# Patient Record
Sex: MALE | Race: WHITE | HISPANIC OR LATINO | Employment: UNEMPLOYED | URBAN - METROPOLITAN AREA
[De-identification: names, ages, dates, MRNs, and addresses within clinical notes are randomized per-mention and may not be internally consistent; named-entity substitution may affect disease eponyms.]

---

## 2018-05-28 ENCOUNTER — HOSPITAL ENCOUNTER (EMERGENCY)
Facility: MEDICAL CENTER | Age: 38
End: 2018-05-28
Attending: EMERGENCY MEDICINE
Payer: MEDICAID

## 2018-05-28 ENCOUNTER — APPOINTMENT (OUTPATIENT)
Dept: RADIOLOGY | Facility: MEDICAL CENTER | Age: 38
End: 2018-05-28
Attending: EMERGENCY MEDICINE
Payer: MEDICAID

## 2018-05-28 VITALS
HEART RATE: 74 BPM | DIASTOLIC BLOOD PRESSURE: 74 MMHG | WEIGHT: 135.58 LBS | BODY MASS INDEX: 20.55 KG/M2 | TEMPERATURE: 97.9 F | HEIGHT: 68 IN | OXYGEN SATURATION: 98 % | RESPIRATION RATE: 15 BRPM | SYSTOLIC BLOOD PRESSURE: 117 MMHG

## 2018-05-28 DIAGNOSIS — R30.0 DYSURIA: ICD-10-CM

## 2018-05-28 DIAGNOSIS — N50.819 TESTICLE PAIN: ICD-10-CM

## 2018-05-28 LAB
APPEARANCE UR: CLEAR
BILIRUB UR QL STRIP.AUTO: NEGATIVE
COLOR UR: YELLOW
GLUCOSE UR STRIP.AUTO-MCNC: NEGATIVE MG/DL
KETONES UR STRIP.AUTO-MCNC: NEGATIVE MG/DL
LEUKOCYTE ESTERASE UR QL STRIP.AUTO: NEGATIVE
MICRO URNS: NORMAL
NITRITE UR QL STRIP.AUTO: NEGATIVE
PH UR STRIP.AUTO: 6.5 [PH]
PROT UR QL STRIP: NEGATIVE MG/DL
RBC UR QL AUTO: NEGATIVE
SP GR UR STRIP.AUTO: 1
UROBILINOGEN UR STRIP.AUTO-MCNC: 0.2 MG/DL

## 2018-05-28 PROCEDURE — 51798 US URINE CAPACITY MEASURE: CPT

## 2018-05-28 PROCEDURE — 87591 N.GONORRHOEAE DNA AMP PROB: CPT

## 2018-05-28 PROCEDURE — 700111 HCHG RX REV CODE 636 W/ 250 OVERRIDE (IP): Performed by: EMERGENCY MEDICINE

## 2018-05-28 PROCEDURE — 96372 THER/PROPH/DIAG INJ SC/IM: CPT

## 2018-05-28 PROCEDURE — 99284 EMERGENCY DEPT VISIT MOD MDM: CPT

## 2018-05-28 PROCEDURE — 76870 US EXAM SCROTUM: CPT

## 2018-05-28 PROCEDURE — 87491 CHLMYD TRACH DNA AMP PROBE: CPT

## 2018-05-28 PROCEDURE — 700102 HCHG RX REV CODE 250 W/ 637 OVERRIDE(OP): Performed by: EMERGENCY MEDICINE

## 2018-05-28 PROCEDURE — A9270 NON-COVERED ITEM OR SERVICE: HCPCS | Performed by: EMERGENCY MEDICINE

## 2018-05-28 PROCEDURE — 700101 HCHG RX REV CODE 250

## 2018-05-28 PROCEDURE — 81003 URINALYSIS AUTO W/O SCOPE: CPT

## 2018-05-28 RX ORDER — LIDOCAINE HYDROCHLORIDE 10 MG/ML
INJECTION, SOLUTION INFILTRATION; PERINEURAL
Status: COMPLETED
Start: 2018-05-28 | End: 2018-05-28

## 2018-05-28 RX ORDER — CEFTRIAXONE SODIUM 250 MG/1
250 INJECTION, POWDER, FOR SOLUTION INTRAMUSCULAR; INTRAVENOUS ONCE
Status: COMPLETED | OUTPATIENT
Start: 2018-05-28 | End: 2018-05-28

## 2018-05-28 RX ORDER — AZITHROMYCIN 250 MG/1
1000 TABLET, FILM COATED ORAL ONCE
Status: COMPLETED | OUTPATIENT
Start: 2018-05-28 | End: 2018-05-28

## 2018-05-28 RX ADMIN — AZITHROMYCIN 1000 MG: 250 TABLET, FILM COATED ORAL at 12:32

## 2018-05-28 RX ADMIN — LIDOCAINE HYDROCHLORIDE: 10 INJECTION, SOLUTION INFILTRATION; PERINEURAL at 12:32

## 2018-05-28 RX ADMIN — CEFTRIAXONE SODIUM 250 MG: 250 INJECTION, POWDER, FOR SOLUTION INTRAMUSCULAR; INTRAVENOUS at 12:32

## 2018-05-28 ASSESSMENT — PAIN SCALES - GENERAL
PAINLEVEL_OUTOF10: 0
PAINLEVEL_OUTOF10: 6

## 2018-05-28 ASSESSMENT — LIFESTYLE VARIABLES: DO YOU DRINK ALCOHOL: NO

## 2018-05-28 NOTE — ED PROVIDER NOTES
"ED Provider Note    Scribed for Mathieu Arteaga M.D. by Hiren Mishra. 5/28/2018  10:45 AM    Primary care provider: None noted  Means of arrival: Walk in  History obtained from: Patient  History limited by: None    CHIEF COMPLAINT  Chief Complaint   Patient presents with   • Painful Urination     Pt reports painful urination/pressure, unable to urinate since yesterday. pt denies hx of such.    • Unable to Urinate       HPI  Abel Orozco is a 38 y.o. male who presents to the Emergency Department for evaluation of acute urinary retention onset this morning when he woke up. The patient endorses associated mild testicular pain that is slightly worse on the left. He denies any dysuria or urethral discharge. The patient reports a new recent sexual partner. No alleviating or exacerbating factors are identified at this time.     REVIEW OF SYSTEMS  Pertinent positives include urinary retention and testicular pain.   Pertinent negatives include no dysuria or urethral discharge.    All other systems reviewed and negative.   C.     PAST MEDICAL HISTORY   None noted    SURGICAL HISTORY  patient denies any surgical history    SOCIAL HISTORY  Social History   Substance Use Topics   • Smoking status: Current Every Day Smoker     Packs/day: 0.50     Types: Cigarettes   • Smokeless tobacco: Never Used   • Alcohol use No      History   Drug Use No       FAMILY HISTORY  History reviewed. No pertinent family history.    CURRENT MEDICATIONS  Home Medications    **Home medications have not yet been reviewed for this encounter**         ALLERGIES  No Known Allergies    PHYSICAL EXAM  VITAL SIGNS: /84   Pulse 76   Temp 36.6 °C (97.9 °F)   Resp 16   Ht 1.727 m (5' 8\")   Wt 61.5 kg (135 lb 9.3 oz)   SpO2 95%   BMI 20.62 kg/m²     Nursing note and vitals reviewed.  Constitutional: Well-developed and well-nourished. No distress.   HENT: Head is normocephalic and atraumatic. Oropharynx is clear and moist without exudate or " erythema.   Eyes: Pupils are equal, round, and reactive to light. Conjunctiva are normal.   Cardiovascular: Normal rate and regular rhythm. No murmur heard. Normal radial pulses.  Pulmonary/Chest: Breath sounds normal. No wheezes or rales.   Abdominal: Soft and non-tender. No distention  No reproducible abdominal tenderness to palpation.   Musculoskeletal: Extremities exhibit normal range of motion without edema or tenderness.   Neurological: Awake, alert and oriented to person, place, and time. No focal deficits noted.  Skin: Skin is warm and dry. No rash.   Psychiatric: Normal mood and affect. Appropriate for clinical situation      DIAGNOSTIC STUDIES / PROCEDURES    LABS  Results for orders placed or performed during the hospital encounter of 05/28/18   URINALYSIS,CULTURE IF INDICATED   Result Value Ref Range    Color Yellow     Character Clear     Specific Gravity 1.005 <1.035    Ph 6.5 5.0 - 8.0    Glucose Negative Negative mg/dL    Ketones Negative Negative mg/dL    Protein Negative Negative mg/dL    Bilirubin Negative Negative    Urobilinogen, Urine 0.2 Negative    Nitrite Negative Negative    Leukocyte Esterase Negative Negative    Occult Blood Negative Negative    Micro Urine Req see below    CHLAMYDIA/GC PCR URINE OR SWAB   Result Value Ref Range    Source Urine      All labs reviewed by me.    RADIOLOGY  [id number]   Final Result      1.  Small bilateral epididymal head cysts.      2.  Questionable mild left varicocele.        The radiologist's interpretation of all radiological studies have been reviewed by me.    COURSE & MEDICAL DECISION MAKING  Nursing notes, VS, PMSFHx reviewed in chart.     10:45 AM - Patient seen and examined at bedside. Ordered Chlyamydia, Urinalysis, and US-Scrotum to evaluate his symptoms. The differential diagnoses include but are not limited to: epididymitis, UTI, STI.    12:12 PM - Reviewed patient's lab and radiology results as shown above.     12:40 PM - Patient  reevaluated at bedside. He was informed of lab and radiology results. Patient will receive Rocephin 250 mg and Zithromax 1000 mg. Patient made aware he will be discharged. He is agreeable.     The patient will return for new or worsening symptoms and is stable at the time of discharge.    DISPOSITION:  Patient will be discharged home in stable condition.    FOLLOW UP:  [facility], Emergency Dept  [address] [facility] [address] [phone]    If symptoms worsen    [address] [address]  [phone]  Schedule an appointment as soon as possible for a visit in 2 days  please have the clinic obtain and review lab results.      OUTPATIENT MEDICATIONS:  There are no discharge medications for this patient.    The patient was discharged home with an information sheet on Dysuria and told to return immediately for any signs or symptoms listed.  The patient agreed to the discharge precautions and follow-up plan which is documented in EPIC.    FINAL IMPRESSION  1. Dysuria    2. Testicle pain          Hiren NICHOLSON (Fe), am scribing for, and in the presence of, Mathieu Arteaga M.D..    Electronically signed by: Hiren Sullivan), 5/28/2018    Mathieu NICHOLSON M.D. personally performed the services described in this documentation, as scribed by Hiren Mishra in my presence, and it is both accurate and complete.    The note accurately reflects work and decisions made by me.  Mathieu Arteaga  5/28/2018  3:43 PM

## 2018-05-28 NOTE — ED TRIAGE NOTES
Chief Complaint   Patient presents with   • Painful Urination     Pt reports painful urination/pressure, unable to urinate since yesterday. pt denies hx of such.    • Unable to Urinate     Explained to pt triage process, made pt aware to tell this RN of any changes/concerns, pt verbalized understanding of process and instructions given. Pt to ER lobby.

## 2018-05-29 LAB
C TRACH DNA SPEC QL NAA+PROBE: NEGATIVE
N GONORRHOEA DNA SPEC QL NAA+PROBE: NEGATIVE
SPECIMEN SOURCE: NORMAL

## 2019-07-16 ENCOUNTER — HOSPITAL ENCOUNTER (EMERGENCY)
Facility: MEDICAL CENTER | Age: 39
End: 2019-07-17
Attending: EMERGENCY MEDICINE

## 2019-07-16 DIAGNOSIS — F41.9 ANXIETY: ICD-10-CM

## 2019-07-16 DIAGNOSIS — F23 ACUTE PSYCHOSIS (HCC): ICD-10-CM

## 2019-07-16 LAB
ALBUMIN SERPL BCP-MCNC: 4.5 G/DL (ref 3.2–4.9)
ALBUMIN/GLOB SERPL: 1.6 G/DL
ALP SERPL-CCNC: 89 U/L (ref 30–99)
ALT SERPL-CCNC: 33 U/L (ref 2–50)
AMPHET UR QL SCN: NEGATIVE
ANION GAP SERPL CALC-SCNC: 9 MMOL/L (ref 0–11.9)
AST SERPL-CCNC: 58 U/L (ref 12–45)
BARBITURATES UR QL SCN: NEGATIVE
BASOPHILS # BLD AUTO: 0.5 % (ref 0–1.8)
BASOPHILS # BLD: 0.03 K/UL (ref 0–0.12)
BENZODIAZ UR QL SCN: NEGATIVE
BILIRUB SERPL-MCNC: 1.4 MG/DL (ref 0.1–1.5)
BUN SERPL-MCNC: 16 MG/DL (ref 8–22)
BZE UR QL SCN: NEGATIVE
CALCIUM SERPL-MCNC: 9.3 MG/DL (ref 8.5–10.5)
CANNABINOIDS UR QL SCN: POSITIVE
CHLORIDE SERPL-SCNC: 108 MMOL/L (ref 96–112)
CO2 SERPL-SCNC: 24 MMOL/L (ref 20–33)
CREAT SERPL-MCNC: 1.17 MG/DL (ref 0.5–1.4)
EOSINOPHIL # BLD AUTO: 0.07 K/UL (ref 0–0.51)
EOSINOPHIL NFR BLD: 1.2 % (ref 0–6.9)
ERYTHROCYTE [DISTWIDTH] IN BLOOD BY AUTOMATED COUNT: 43.3 FL (ref 35.9–50)
ETHANOL BLD-MCNC: 0 G/DL
GLOBULIN SER CALC-MCNC: 2.9 G/DL (ref 1.9–3.5)
GLUCOSE SERPL-MCNC: 104 MG/DL (ref 65–99)
HCT VFR BLD AUTO: 45.7 % (ref 42–52)
HGB BLD-MCNC: 15.7 G/DL (ref 14–18)
IMM GRANULOCYTES # BLD AUTO: 0.02 K/UL (ref 0–0.11)
IMM GRANULOCYTES NFR BLD AUTO: 0.3 % (ref 0–0.9)
LYMPHOCYTES # BLD AUTO: 1.83 K/UL (ref 1–4.8)
LYMPHOCYTES NFR BLD: 30.5 % (ref 22–41)
MCH RBC QN AUTO: 31.2 PG (ref 27–33)
MCHC RBC AUTO-ENTMCNC: 34.4 G/DL (ref 33.7–35.3)
MCV RBC AUTO: 90.9 FL (ref 81.4–97.8)
METHADONE UR QL SCN: NEGATIVE
MONOCYTES # BLD AUTO: 0.49 K/UL (ref 0–0.85)
MONOCYTES NFR BLD AUTO: 8.2 % (ref 0–13.4)
NEUTROPHILS # BLD AUTO: 3.56 K/UL (ref 1.82–7.42)
NEUTROPHILS NFR BLD: 59.3 % (ref 44–72)
NRBC # BLD AUTO: 0 K/UL
NRBC BLD-RTO: 0 /100 WBC
OPIATES UR QL SCN: NEGATIVE
OXYCODONE UR QL SCN: NEGATIVE
PCP UR QL SCN: NEGATIVE
PLATELET # BLD AUTO: 217 K/UL (ref 164–446)
PMV BLD AUTO: 9.5 FL (ref 9–12.9)
POTASSIUM SERPL-SCNC: 4.1 MMOL/L (ref 3.6–5.5)
PROPOXYPH UR QL SCN: NEGATIVE
PROT SERPL-MCNC: 7.4 G/DL (ref 6–8.2)
RBC # BLD AUTO: 5.03 M/UL (ref 4.7–6.1)
SODIUM SERPL-SCNC: 141 MMOL/L (ref 135–145)
WBC # BLD AUTO: 6 K/UL (ref 4.8–10.8)

## 2019-07-16 PROCEDURE — 99285 EMERGENCY DEPT VISIT HI MDM: CPT

## 2019-07-16 PROCEDURE — 99284 EMERGENCY DEPT VISIT MOD MDM: CPT | Mod: GC | Performed by: PSYCHIATRY & NEUROLOGY

## 2019-07-16 PROCEDURE — 700111 HCHG RX REV CODE 636 W/ 250 OVERRIDE (IP): Performed by: EMERGENCY MEDICINE

## 2019-07-16 PROCEDURE — 85025 COMPLETE CBC W/AUTO DIFF WBC: CPT

## 2019-07-16 PROCEDURE — 80307 DRUG TEST PRSMV CHEM ANLYZR: CPT

## 2019-07-16 PROCEDURE — 96374 THER/PROPH/DIAG INJ IV PUSH: CPT

## 2019-07-16 PROCEDURE — 80053 COMPREHEN METABOLIC PANEL: CPT

## 2019-07-16 RX ORDER — LORAZEPAM 2 MG/ML
2 INJECTION INTRAMUSCULAR ONCE
Status: DISCONTINUED | OUTPATIENT
Start: 2019-07-16 | End: 2019-07-16

## 2019-07-16 RX ORDER — DIPHENHYDRAMINE HYDROCHLORIDE 50 MG/ML
25 INJECTION INTRAMUSCULAR; INTRAVENOUS ONCE
Status: DISCONTINUED | OUTPATIENT
Start: 2019-07-16 | End: 2019-07-17 | Stop reason: HOSPADM

## 2019-07-16 RX ORDER — LORAZEPAM 2 MG/ML
1 INJECTION INTRAMUSCULAR ONCE
Status: COMPLETED | OUTPATIENT
Start: 2019-07-16 | End: 2019-07-16

## 2019-07-16 RX ORDER — ARIPIPRAZOLE 10 MG/1
10 TABLET ORAL DAILY
Status: DISCONTINUED | OUTPATIENT
Start: 2019-07-16 | End: 2019-07-17 | Stop reason: HOSPADM

## 2019-07-16 RX ORDER — HALOPERIDOL 5 MG/ML
5 INJECTION INTRAMUSCULAR ONCE
Status: DISCONTINUED | OUTPATIENT
Start: 2019-07-16 | End: 2019-07-17 | Stop reason: HOSPADM

## 2019-07-16 RX ORDER — LORAZEPAM 2 MG/ML
2 INJECTION INTRAMUSCULAR ONCE
Status: DISCONTINUED | OUTPATIENT
Start: 2019-07-16 | End: 2019-07-17 | Stop reason: HOSPADM

## 2019-07-16 RX ADMIN — LORAZEPAM 1 MG: 2 INJECTION INTRAMUSCULAR; INTRAVENOUS at 08:26

## 2019-07-16 ASSESSMENT — ENCOUNTER SYMPTOMS
SHORTNESS OF BREATH: 0
HEADACHES: 0
SORE THROAT: 0
DIARRHEA: 0
CONSTIPATION: 0
COUGH: 0
FEVER: 0
VOMITING: 0
DIZZINESS: 0
DEPRESSION: 0
NAUSEA: 0
BLURRED VISION: 0
NERVOUS/ANXIOUS: 1
HALLUCINATIONS: 0
HEARTBURN: 0

## 2019-07-16 ASSESSMENT — LIFESTYLE VARIABLES: SUBSTANCE_ABUSE: 1

## 2019-07-16 NOTE — ED NOTES
"Pt is very anxious with rambling speech saying that \"I have been working out and working 24/7 and I have not eaten or drank anything for the last 4 days for a sacred initiation\" and \"I have broken all my bones for the initiation.\" Pt's best friend is at the bedside and states that he had an episode similar to this in February where he was very violent, got incarcerated, and \"they said it was a psychotic break.\" She states that at that time he slept for 4 days in a row and then \"got back to himself.\" She says that she doesn't know if he was diagnosed with anything at that time and didn't start taking any medications. She states she thinks that he may have a psychiatrist but that he hasn't been going or taking meds. She also states that he was much more anxious and violent last night but they could not convince the pt to come to the hospital until today.  "

## 2019-07-16 NOTE — ED TRIAGE NOTES
"Chief Complaint   Patient presents with   • Anxiety   • Dizziness     One week of anxiety, dizziness, and nausea.      /85   Pulse 69   Temp 36.1 °C (96.9 °F) (Temporal)   Resp 20   Ht 1.727 m (5' 8\")   Wt 55.5 kg (122 lb 5.7 oz)   SpO2 98%   BMI 18.60 kg/m²     40 y/o male presents to ED with vague complaints of anxiety, nausea, and dizziness. Patient's friend states that he, \"has not been acting himself over the past week\". She states he had a similar episode in 02/2019 of, \"psychiatric crisis\", and was sent to detention, but does not have any formal psych diagnoses.  Friend states he has not slept in several days. He states he has been stressed as he lost his job last week.  Denies SI, Denies HI.     Patient appears anxious and noted to have pressured speech in triage. Educated on triage process and to notify triage RN of any changes.   "

## 2019-07-16 NOTE — ED NOTES
Pt ambulated from lobby to [address] without assistance, tolerated well. Pt is now resting in bed with even and unlabored breaths, in no distress at this time. Will continue to monitor pt while awaiting further orders.

## 2019-07-16 NOTE — ED NOTES
"Psych resident aware pt will not take medications. Pt continues to roll on ground and states \"he needs to do it to calm down\".   "

## 2019-07-16 NOTE — CONSULTS
Alert Team  Pt seen directly by psychiatry today.  Pt placed on legal hold.  Pt remains on AT services for any needs while in the ER.

## 2019-07-16 NOTE — ED NOTES
Pt comes out of room and enters nurses station hostile. Pt began yelling at nurse. Pt asked to back up and he got more angry. Security called. Pt medicated per orders

## 2019-07-16 NOTE — ED NOTES
Report given to ARACELIS Currie. Pt is sitting on bed talking to friend at bedside. Pt does not appear to be in distress but he is still very anxious.

## 2019-07-16 NOTE — ED NOTES
Med Rec completed per patient   Allergies reviewed  No ORAL antibiotics in last 14 days    Patient stated he takes no medications

## 2019-07-16 NOTE — ED PROVIDER NOTES
"ED Provider Note    ED Provider Note    Primary care provider: No primary care provider on file.  Means of arrival: POV  History obtained from: Patient, Ex-wife  History limited by: None    CHIEF COMPLAINT  Chief Complaint   Patient presents with   • Anxiety   • Dizziness     One week of anxiety, dizziness, and nausea.        HPI  Jamil Orozco is a 39 y.o. male who presents to the Emergency Department with a chief complaint of anxiety and dizziness.  Patient states he is at the end of a 4-day \"sacred ritual\".  States he been preparing for this for the last 30 years.  He is dealt with anxiety his whole life.  As part of this ritual, he has not eaten or drink  for the last 4 days.  And he is also been working out all day for the house.  States he is \"broken all of the bones in his body\" as part of this ritual.  He denies any drug use other than occasional marijuana cigarettes.  He denies any past medical history.  His ex-wife, who brought him in and is at the bedside states that he had a similar episode in February of this year.  At that time, he was more combative and violent.  He was incarcerated at the time and was diagnosed with a \"psychotic break\".  At that time, she states no drugs use was apparent.  She states that he is lost quite a bit of weight since she saw him last.  He is to her knowledge, he takes no medications.  It sounds like he was referred to a psychiatrist when he was discharged from custodial but she does not believe that he is followed up with one.  The patient denies ever having seen a psychiatrist.  He complains of diet he.  Denies suicidal ideations.    REVIEW OF SYSTEMS  Review of Systems   Unable to perform ROS: Psychiatric disorder   Constitutional: Negative for fever.   HENT: Negative for congestion.    Respiratory: Negative for shortness of breath.    Cardiovascular: Negative for chest pain.   Gastrointestinal: Negative for vomiting.   Genitourinary: Negative for dysuria.   Neurological: " "Negative for headaches.   Psychiatric/Behavioral: Negative for suicidal ideas. The patient is nervous/anxious.        PAST MEDICAL HISTORY   Patient denies any past medical history.    SURGICAL HISTORY  patient denies any surgical history    SOCIAL HISTORY  Social History   Substance Use Topics   • Smoking status: Current Every Day Smoker     Packs/day: 0.50     Types: Cigarettes   • Smokeless tobacco: Never Used   • Alcohol use No      History   Drug Use No       FAMILY HISTORY  History reviewed. No pertinent family history.    CURRENT MEDICATIONS  Home Medications     Reviewed by Shahla oByd R.N. (Registered Nurse) on 07/16/19 at 0621  Med List Status: Not Addressed   Medication Last Dose Status        Patient Clement Taking any Medications                       ALLERGIES  No Known Allergies    PHYSICAL EXAM  VITAL SIGNS: /85   Pulse 69   Temp 36.1 °C (96.9 °F) (Temporal)   Resp 20   Ht 1.727 m (5' 8\")   Wt 55.5 kg (122 lb 5.7 oz)   SpO2 98%   BMI 18.60 kg/m²   Vitals reviewed.  Constitutional: Patient is oriented to person, place, and time. Appears well-developed and well-nourished. No distress.  Restless, occasionally sits on the bed, otherwise paces around the exam room.  His speech is rapid.  Head: Normocephalic and atraumatic.   Ears: Normal external ears bilaterally.   Mouth/Throat: Oropharynx is clear and moist  Eyes: Conjunctivae are normal. Pupils are equal, round, and reactive to light.   Neck: Normal range of motion. Neck supple.  Cardiovascular: Normal rate, regular rhythm and normal heart sounds.   Pulmonary/Chest: Effort normal and breath sounds normal. No respiratory distress, no wheezes, rhonchi, or rales.   Abdominal: Soft. Bowel sounds are normal. There is no tenderness. No rebound or guarding, or peritoneal signs.   Musculoskeletal: No edema  Neurological: No focal deficits.   Skin: Skin is warm and dry. No erythema. No pallor.   Psychiatric: Rapid speech. Denies " SI/HI.    LABS  Results for orders placed or performed during the hospital encounter of 07/16/19   URINE DRUG SCREEN (TRIAGE)   Result Value Ref Range    Amphetamines Urine Negative Negative    Barbiturates Negative Negative    Benzodiazepines Negative Negative    Cocaine Metabolite Negative Negative    Methadone Negative Negative    Opiates Negative Negative    Oxycodone Negative Negative    Phencyclidine -Pcp Negative Negative    Propoxyphene Negative Negative    Cannabinoid Metab Positive (A) Negative   CBC WITH DIFFERENTIAL   Result Value Ref Range    WBC 6.0 4.8 - 10.8 K/uL    RBC 5.03 4.70 - 6.10 M/uL    Hemoglobin 15.7 14.0 - 18.0 g/dL    Hematocrit 45.7 42.0 - 52.0 %    MCV 90.9 81.4 - 97.8 fL    MCH 31.2 27.0 - 33.0 pg    MCHC 34.4 33.7 - 35.3 g/dL    RDW 43.3 35.9 - 50.0 fL    Platelet Count 217 164 - 446 K/uL    MPV 9.5 9.0 - 12.9 fL    Neutrophils-Polys 59.30 44.00 - 72.00 %    Lymphocytes 30.50 22.00 - 41.00 %    Monocytes 8.20 0.00 - 13.40 %    Eosinophils 1.20 0.00 - 6.90 %    Basophils 0.50 0.00 - 1.80 %    Immature Granulocytes 0.30 0.00 - 0.90 %    Nucleated RBC 0.00 /100 WBC    Neutrophils (Absolute) 3.56 1.82 - 7.42 K/uL    Lymphs (Absolute) 1.83 1.00 - 4.80 K/uL    Monos (Absolute) 0.49 0.00 - 0.85 K/uL    Eos (Absolute) 0.07 0.00 - 0.51 K/uL    Baso (Absolute) 0.03 0.00 - 0.12 K/uL    Immature Granulocytes (abs) 0.02 0.00 - 0.11 K/uL    NRBC (Absolute) 0.00 K/uL   Blood Alcohol   Result Value Ref Range    Diagnostic Alcohol 0.00 0.00 g/dL   COMP METABOLIC PANEL   Result Value Ref Range    Sodium 141 135 - 145 mmol/L    Potassium 4.1 3.6 - 5.5 mmol/L    Chloride 108 96 - 112 mmol/L    Co2 24 20 - 33 mmol/L    Anion Gap 9.0 0.0 - 11.9    Glucose 104 (H) 65 - 99 mg/dL    Bun 16 8 - 22 mg/dL    Creatinine 1.17 0.50 - 1.40 mg/dL    Calcium 9.3 8.5 - 10.5 mg/dL    AST(SGOT) 58 (H) 12 - 45 U/L    ALT(SGPT) 33 2 - 50 U/L    Alkaline Phosphatase 89 30 - 99 U/L    Total Bilirubin 1.4 0.1 - 1.5 mg/dL     Albumin 4.5 3.2 - 4.9 g/dL    Total Protein 7.4 6.0 - 8.2 g/dL    Globulin 2.9 1.9 - 3.5 g/dL    A-G Ratio 1.6 g/dL   ESTIMATED GFR   Result Value Ref Range    GFR If African American >60 >60 mL/min/1.73 m 2    GFR If Non African American >60 >60 mL/min/1.73 m 2       All labs reviewed by me.    COURSE & MEDICAL DECISION MAKING  Pertinent Labs & Imaging studies reviewed. (See chart for details)    Obtained and reviewed past medical records.  Patient's last encounter was in May 2018, he was seen for painful urination and testicular pain.    6:27 AM - Patient seen and examined at bedside.  This is a pleasant at this time, cooperative, 39-year-old male who presents with what appears to be likely acute psychosis.  He describes a sacred ritual that he has undergone for the last 4 days.  I have suggested to him, evaluation with IV and lab work to assess electrolytes and hydration status if indeed, he has not had anything to eat or drink for the last 4 days.  Will also obtain a diagnostic alcohol and drug screen.  Patient complains of feeling anxious but declines any medication.  States he wants to end this ritual on his own.  States he is prepared food at home for that time.  Despite his ex-wife and per his description, his best friend, encouragement, he refuses any medication to help calm him down.  Vital signs are normal at this time.  He is quite restless but denies SI.  Await medical evaluation for clearance then anticipate, alert team evaluation.    8:17 AM, patient's labs reviewed.  Overall unrevealing there are some mild abnormalities.  White blood cell counts normal.  No neutrophilic shift.  AST was 58.  Patient getting more and more anxious.  At this time, he is amenable to treatment.  He is given a milligram of IV Ativan.  Will notify the alert team for evaluation.    Patient reevaluated the bedside.  He is resting and appears comfortable.  Labs were unrevealing.  He is awaiting evaluation by the pain, patient  will be placed on a legal hold.  Care will be signed out to Dr. Yañez, in anticipation of transfer to a psychiatric facility.  Patient is in stable condition at this time.  Responded well to Ativan.      FINAL IMPRESSION  1. Acute psychosis (HCC)

## 2019-07-16 NOTE — DISCHARGE PLANNING
Alert Team  Per [facility], pt does NOT have active Medicaid HMO.  Spoke with PAR and noted that facesheet not updated this morning after they found pt ineligible.  PAR to update facesheet, which will now show pt to be uninsured.  Notified SW to hold off sending transfer packets til facesheet updated.    He will now be waiting for a bed at [facility].

## 2019-07-17 VITALS
BODY MASS INDEX: 18.54 KG/M2 | HEART RATE: 57 BPM | TEMPERATURE: 98.8 F | DIASTOLIC BLOOD PRESSURE: 85 MMHG | WEIGHT: 122.36 LBS | SYSTOLIC BLOOD PRESSURE: 123 MMHG | HEIGHT: 68 IN | OXYGEN SATURATION: 98 % | RESPIRATION RATE: 16 BRPM

## 2019-07-17 PROCEDURE — 99282 EMERGENCY DEPT VISIT SF MDM: CPT | Performed by: PSYCHIATRY & NEUROLOGY

## 2019-07-17 NOTE — DISCHARGE PLANNING
Medical Social Work    Referral: Legal Hold    Intervention: Legal Hold Paperwork given to SW by [employer] RN: Jacklyn    Legal Hold Initiated: Date: 07/16/2019  Time: 0145    Legal Hold faxed: Date: 07/16/2019  Time: 2120    Patient’s Insurance Listed on Face Sheet: None (Medicaid HMO listed on facesheet; however, inactive according to media tab).    [place] [facility] [place]    Plan: Patient will transfer to mental health facility once acceptance is obtained.

## 2019-07-17 NOTE — ED NOTES
Patient resting in bed, appears to be sleeping with even rise and fall of chest noted. No obvious signs of pain or distress, sitter in hallway performing direct observation, repositions self occasionally, bed in low position, safety room features in place, no cough noted, frequent rounding performed, will continue to assess patient.

## 2019-07-17 NOTE — PSYCHIATRY
"PSYCHIATRIC FOLLOW-UP:(established)  *Reason for admission: Anxiety and Dizziness                   *Reason for consult:  anxiety  *Requesting Physician/APN:  Dr. Almaguer                *HPI: Pt reports feeling good today.  He states he slept well, much better compared to the past few days, and currently feels tired. Pt denies any acute symptoms of lev or hypomania at this time. He also denies any AVH, no paranoia or delusions elicited. He denies any depressed mood, SI/HI. His plan after discharge is to go to the court, he is currently on probation and needs to present himself daily. He is planning to obtain a job, and continue to stay with his sister. He likes to meditate and exercise. Pt denies having to take any medications at home. He currently does not feel anxious. Pt apologized for his behavior yesterday, and stated that is not common for him to behave agitated.  Pt is interested on getting a PCP f/up appointment. He does not want medications at this time, and continues to refuse psychotropic medications.           Medical ROS (as pertinent): denies any                       *Psychiatric Examination:  Vitals:   Vitals:    07/17/19 0908   BP: 123/85   Pulse: (!) 57   Resp: 16   Temp: 37.1 °C (98.8 °F)   SpO2: 98%       General Appearance:  man, appears stated age, moderate grooming, wearing hospital gown, calm and cooperative, pleasant  Good eye contact  Abnormal Movements: none  Gait and Posture: normal gait  Speech: normal volume, tone and rhythm   Thought processes:linear and goal oriented  Associations: no loose associations   Abnormal or Psychotic Thoughts: denies any AVH, no paranoia or delusions elicited  Judgement and Insight: fair/fair  Orientation: grossly oriented  Recent and Remote Memory: fair  Attention Span and Concentration: intact  Language:fluid  Kowledge:appropraite for age  Mood and Affect:\"I am feeling good\", euthymic, appropriate   SI/HI:denies any SI/HI    Current " Facility-Administered Medications   Medication Dose Route Frequency Provider Last Rate Last Dose   • ARIPiprazole (ABILIFY) tablet 10 mg  10 mg Oral DAILY Hudson Black M.D.       • haloperidol lactate (HALDOL) injection 5 mg  5 mg Intramuscular Once Thad KIRK Yañez M.D.   Stopped at 07/16/19 1630   • LORazepam (ATIVAN) injection 2 mg  2 mg Intramuscular Once Thad KIRK Yañez M.DZac   Stopped at 07/16/19 1630   • diphenhydrAMINE (BENADRYL) injection 25 mg  25 mg Intramuscular Once Thad KIRK Yañez M.D.   Stopped at 07/16/19 1630     No current outpatient prescriptions on file.     No results found for this or any previous visit (from the past 24 hour(s)).        Assessment:Pt at this time does not present with any acute symptoms or signs of psychosis, lev, depression or psychosis. His thought process and behavior are organized. He does not meet criteria for further higher level of care in an inpatient psychiatric hospital at this time. He denies any SI/HI, and is future oriented. Pt at this time is at low acute risk of danger to self or others. Pt is psychiatric cleared for discharge.    Dx: hypomania with psychotic features    Plan:  1-Legal hold: discontinued  2- Pt is psychiatrically cleared for discharge  3- No scripts will be given to the patient. He has been refusing medications.   4- Pt was advised to f/up with PCP within 1-2 weeks from discharge. Referrals are being given for PCP, as well as for outpatient psychiatric care.  5- Pt was advised to call 911 or return to the ED in case of emergency  6- Psychiatry signing off    Thank you for the consult

## 2019-07-17 NOTE — ED PROVIDER NOTES
ED PROVIDER NOTE    Scribed for Dayday Lopez M.D. by Nicolás Lopez. 7/17/2019, 3:08 PM.    This is an addendum to the note on Jamil Orozco. For further details and full chart entry, see the previously signed ED Provider Note written by Aj (CORONA).      3:08 PM - I discussed the patient's case with Aj(ERP) who will transfer care of the patient to me at this time.        3:08 PM - The patient has been cleared by psychiatry. I will discharged the patient with anxiety inflammation.        I, Nicolás Lopez (Scribe), am scribing for, and in the presence of, Dayday Lopez M.D..    Electronically signed by: Nicolás Lopez (Scribe), 7/17/2019    IDayday M.D. personally performed the services described in this documentation, as scribed by Nicolás Lopez in my presence, and it is both accurate and complete.    The note accurately reflects work and decisions made by me.  Dayday Lopez  7/17/2019  7:16 PM

## 2019-07-17 NOTE — ED NOTES
Patient ate breakfast, up to bathroom approximately once an hour.     Otherwise, patient sits and stares/smiles at sitter. NAD noted. Vitals stable. Within line of sight for q 15 min checks.

## 2019-07-17 NOTE — DISCHARGE PLANNING
ALERT team  note:  39 year old male admitted 7/16/19, legal hold, inability to care for self; no active insurance plan; evaulated by [facility] ED psychiatry team, legal hold DC'd; pt with organized thoughts and behaviors; denies SI, HI, self-harm ideation; writer RN reviewed community  resources with [facility] including [facility], [facility], with written information given; writer RN encouraged pt to apply for a [place] insurance plan at the welfare office; pt verbalized understanding; pt to DC to self
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
## 2019-07-17 NOTE — ED NOTES
Patient resting in room, NAD noted. Equal chest rise and fall. Pt remains line of sight of RN and sitter.

## 2019-07-17 NOTE — ED NOTES
Patient's home medications have been reviewed by the pharmacy team.   Seen in the ED for acute psychosis.     History reviewed. No pertinent past medical history.    Patient's Medications    No medications on file        A:  Patient is not on any medications at home.       P:    No recommendations at this time.     Tanya Resendez

## 2019-07-17 NOTE — ED PROVIDER NOTES
ED Provider Note    The patient is waiting placement to a psychiatric facility.  He has been refusing all medications.  He was getting more agitated, uncooperative, and restless.  Orders for  Haldol 5 mg, Ativan 2 mg and Benadryl 25 mg IM were given to nursing.  However afterwards the patient was noted to be more calm and cooperative, did not require the injection.  Patient was seen by psychiatry, to be in a manic episode with psychotic features.  A legal 2000 was placed, and patient was to be started on Abilify 10 mg daily for psychosis and lev.    Thad Yañez M.D.

## 2019-07-17 NOTE — ED NOTES
Pt appears to be resting quietly. Even, easy respirations noted. Does not appear to be distressed, will continue to monitor. Sitter in direct observation outside door.

## 2019-07-17 NOTE — ED NOTES
"Pt resting quietly on gurney. Refuses morning medication, states \"I am feeling fine and I don't need any medication.\"  "

## 2019-07-17 NOTE — ED PROVIDER NOTES
ED Provider Note    Patient does not have any medical complaints.  His vital signs are stable.  He has been cooperative.  We will proceed with plan for psychiatric care.

## 2019-07-17 NOTE — ED NOTES
Cleared from legal hold, [facility] contacted to pick patient up. Awaiting DC instructions and will clear him for DC per ER MD order.

## 2019-07-17 NOTE — ED NOTES
Pt pacing in room, appears calm with no obvious distress or agitation. Will continue to monitor. Sitter in direct observation.

## 2019-07-17 NOTE — ED NOTES
Patient states he feels better today after having slept. Denies SI/HI, states he does not want a shower today.     Otherwise resting in gurney, NAD noted and within line of sight of kei.

## 2019-12-11 ENCOUNTER — HOSPITAL ENCOUNTER (EMERGENCY)
Facility: MEDICAL CENTER | Age: 39
End: 2019-12-12
Attending: EMERGENCY MEDICINE

## 2019-12-11 VITALS
BODY MASS INDEX: 18.28 KG/M2 | OXYGEN SATURATION: 94 % | HEIGHT: 68 IN | RESPIRATION RATE: 16 BRPM | TEMPERATURE: 98.2 F | WEIGHT: 120.59 LBS | SYSTOLIC BLOOD PRESSURE: 126 MMHG | DIASTOLIC BLOOD PRESSURE: 72 MMHG | HEART RATE: 84 BPM

## 2019-12-11 DIAGNOSIS — S60.511A ABRASION OF RIGHT HAND, INITIAL ENCOUNTER: ICD-10-CM

## 2019-12-11 PROCEDURE — 99283 EMERGENCY DEPT VISIT LOW MDM: CPT

## 2019-12-12 ENCOUNTER — HOSPITAL ENCOUNTER (EMERGENCY)
Facility: MEDICAL CENTER | Age: 39
End: 2019-12-12
Attending: EMERGENCY MEDICINE

## 2019-12-12 VITALS
OXYGEN SATURATION: 97 % | TEMPERATURE: 98.2 F | HEIGHT: 68 IN | BODY MASS INDEX: 18.28 KG/M2 | DIASTOLIC BLOOD PRESSURE: 82 MMHG | SYSTOLIC BLOOD PRESSURE: 119 MMHG | WEIGHT: 120.59 LBS | RESPIRATION RATE: 16 BRPM | HEART RATE: 86 BPM

## 2019-12-12 DIAGNOSIS — B34.9 VIRAL ILLNESS: ICD-10-CM

## 2019-12-12 PROCEDURE — 99282 EMERGENCY DEPT VISIT SF MDM: CPT

## 2019-12-12 NOTE — ED NOTES
Discharge instructions given to patient, a verbal understanding of all instructions was stated. Pt preferred to walk out accompanied by self VSS, all belongings accounted for.

## 2019-12-12 NOTE — ED PROVIDER NOTES
"ED Provider Note    CHIEF COMPLAINT  Chief Complaint   Patient presents with   • Other       HPI  Jamil Orozco is a 39 y.o. male who presents with a sore throat.  The patient was just discharged in the hospital after he was found to have a small abrasion on his hand.  I suspect the patient is coming into the hospital for secondary gain.  He is currently homeless.  He states he has a lesion on his lip as well as a sore throat and a cough.  He states he also feels febrile.  He has not had any vomiting.  He states he was recently kicked out of the homeless shelter.    REVIEW OF SYSTEMS  No vomiting, no change in bowel or bladder habits    PHYSICAL EXAM  VITAL SIGNS: /82   Pulse 86   Temp 36.8 °C (98.2 °F) (Oral)   Resp 16   Ht 1.727 m (5' 8\")   Wt 54.7 kg (120 lb 9.5 oz)   SpO2 97%   BMI 18.34 kg/m²   In general the patient is unkempt and unpleasant.  He does not appear toxic.    Viral ulceration on the lip, oropharynx not erythematous    Nares are moist    Pulmonary chest clear to auscultation bilaterally    Cardiovascular S1-S2 with a regular rate and rhythm      COURSE & MEDICAL DECISION MAKING  Pertinent Labs & Imaging studies reviewed. (See chart for details)  This is a 39-year-old male who presents the emergency department with a suspected viral process.  I also suspect the patient's here for secondary gain as he is asking to be admitted for observation as he is homeless.  I told the patient needs to go back to the homeless shelter.  The patient is instructed take Motrin as needed for inflammation.  He is instructed to drink lots of fluids.  I will give the patient a referral to the [facility] tomorrow for routine health maintenance.    FINAL IMPRESSION  1.  Viral illness       Disposition  The patient will be discharged in stable condition      Electronically signed by: Jose Francisco Chen, 12/12/2019 1:22 AM      "

## 2019-12-12 NOTE — ED PROVIDER NOTES
"ED Provider Note    Scribed for Damion Damon D.O. by Jordan Edmondson. 12/11/2019  11:19 PM    Primary care provider: None noted  Means of arrival: Walk In  History obtained from: Patient  History limited by: None    CHIEF COMPLAINT  Chief Complaint   Patient presents with   • Hand Pain     R sided. pt put hand on a broken glass on the floor.       HPI  Jamil Orozco is a 39 y.o. male who presents to the Emergency Department for evaluation of right sided hand pain. He states that three weeks ago he placed his hand on the floor where there was broken glass. At that time he sustained a small wound to the palmar surface of his hand and has been experiencing right sided hand pain at the same location for the last three weeks. He presents here at this time for evaluation of the pain. He denies any numbness or weakness to the hand.    REVIEW OF SYSTEMS  Pertinent positives include: right hand pain  Pertinent negatives include: numbness, weakness  See HPI for further details.    ALLERGIES  No Known Allergies    CURRENT MEDICATIONS  Home Medications     Reviewed by Richelle Wren R.N. (Registered Nurse) on 12/11/19 at 2307  Med List Status: Not Addressed   Medication Last Dose Status        Patient Clement Taking any Medications                     PAST MEDICAL HISTORY  Patient denies any past medical problems or history.    SURGICAL HISTORY  patient denies any surgical history    SOCIAL HISTORY  Social History     Tobacco Use   • Smoking status: Current Every Day Smoker     Packs/day: 0.50     Types: Cigarettes   • Smokeless tobacco: Never Used   Substance Use Topics   • Alcohol use: No   • Drug use: No      Social History     Substance and Sexual Activity   Drug Use No       FAMILY HISTORY  History reviewed. No pertinent family history.      PHYSICAL EXAM  VITAL SIGNS: /88   Pulse (!) 112   Temp 36.8 °C (98.2 °F) (Oral)   Resp 20   Ht 1.727 m (5' 8\")   Wt 54.7 kg (120 lb 9.5 oz)   SpO2 95%   BMI 18.34 kg/m² "   Constitutional: Well-nourished, Well developed. In mild distress.   Head: Normocephalic, Atraumatic  Eyes: PERRL, sclera anicteric, EOMI, no lid swelling  ENT: No nasal discharge or epistaxis. No facial deformity. Mucous membranes are moist.   Lungs: No respiratory distress.  Psychiatric: Cooperative. Appropriate mood and affect.   Skin: Right hand has a scabbed wound on the palm, no palpable foreign body, no erythema, Left hand over the thumb at the IP joint has an abrasion  MSK: Full range of motion of joints in bilateral hands    COURSE & MEDICAL DECISION MAKING:  Nursing notes, VS, PMSFHx reviewed in chart.     11:19 PM - Patient seen and examined at bedside. Discussed with the patient that on examination there are no obvious or palpable foreign bodies. He is made aware that if he did retain a piece of glass at that time, it is now at the point where it will need to work itself out, however I doubt the presence of a foreign body. Answered any questions the patient had. He understands and agrees to discharge after his vital signs stabilize.     Patients repeat vital signs are within normal limits. Plan for discharge.    FINAL IMPRESSION:  1. Abrasion of right hand, initial encounter       The patient will return for new or worsening symptoms and is stable at the time of discharge.    The patient is referred to a primary physician for blood pressure management, diabetic screening, and for all other preventative health concerns.    DISPOSITION:  Patient will be discharged home in stable condition.    FOLLOW UP:  [facility], Emergency Dept  [facility] [facility] [address] [phone]    If symptoms worsen       Jordan NICHOLSON (Fe), am scribing for, and in the presence of, Damion Damon D.O..    Electronically signed by: Jordan Edmondson (Fe), 12/11/2019    Damion NICHOLSON D.O. personally performed the services described in this documentation, as scribed by Jordan Edmondson  in my presence, and it is both accurate and complete. E.      Please note that this dictation was created using voice recognition software. I have worked with consultants from the vendor as well as technical experts from [facility] to optimize the interface. I have made every reasonable attempt to correct obvious errors, but I expect that there are errors of grammar and possibly content that I did not discover before finalizing the note.

## 2019-12-12 NOTE — ED TRIAGE NOTES
"Chief Complaint   Patient presents with   • Other     Pt was discharged from this facility earlier and pt refused to leave ED lobby. Pt continued to try to sleep in ED lobby. Asked pt to leave and he says he wants to check back in because \" I work for the [employer] and I don't feel good\".       "

## 2019-12-12 NOTE — ED NOTES
Pt seen by ERP in triage.   Discharge instructions given to pt. Prescriptions unchanged. Pt educated, verbalizes understanding. All belongings accounted for. Pt refused to leave ED with security at side to trespass pt.

## 2019-12-12 NOTE — ED TRIAGE NOTES
"Chief Complaint   Patient presents with   • Hand Pain     R sided. pt put hand on a broken glass on the floor.     Pt ambulatory to triage for above complaint. Pt has a small cut on palm of hand. Pt is unsure if he has glass in it still or not. Pt states tetanus is not up to date.     /88   Pulse (!) 112   Temp 36.8 °C (98.2 °F) (Oral)   Resp 20   Ht 1.727 m (5' 8\")   Wt 54.7 kg (120 lb 9.5 oz)   SpO2 95%   BMI 18.34 kg/m²     "

## 2020-05-15 ENCOUNTER — HOSPITAL ENCOUNTER (EMERGENCY)
Facility: MEDICAL CENTER | Age: 40
End: 2020-05-15
Attending: EMERGENCY MEDICINE
Payer: MEDICAID

## 2020-05-15 ENCOUNTER — APPOINTMENT (OUTPATIENT)
Dept: RADIOLOGY | Facility: MEDICAL CENTER | Age: 40
End: 2020-05-15
Attending: EMERGENCY MEDICINE
Payer: MEDICAID

## 2020-05-15 VITALS
TEMPERATURE: 98.6 F | WEIGHT: 152.56 LBS | BODY MASS INDEX: 23.12 KG/M2 | RESPIRATION RATE: 16 BRPM | OXYGEN SATURATION: 98 % | DIASTOLIC BLOOD PRESSURE: 76 MMHG | SYSTOLIC BLOOD PRESSURE: 127 MMHG | HEIGHT: 68 IN | HEART RATE: 80 BPM

## 2020-05-15 DIAGNOSIS — F41.9 ANXIETY: ICD-10-CM

## 2020-05-15 DIAGNOSIS — R07.9 CHEST PAIN, UNSPECIFIED TYPE: ICD-10-CM

## 2020-05-15 LAB
ANION GAP SERPL CALC-SCNC: 12 MMOL/L (ref 7–16)
BASOPHILS # BLD AUTO: 0.3 % (ref 0–1.8)
BASOPHILS # BLD: 0.03 K/UL (ref 0–0.12)
BUN SERPL-MCNC: 8 MG/DL (ref 8–22)
CALCIUM SERPL-MCNC: 9.5 MG/DL (ref 8.5–10.5)
CHLORIDE SERPL-SCNC: 94 MMOL/L (ref 96–112)
CO2 SERPL-SCNC: 26 MMOL/L (ref 20–33)
CREAT SERPL-MCNC: 0.85 MG/DL (ref 0.5–1.4)
EOSINOPHIL # BLD AUTO: 0.25 K/UL (ref 0–0.51)
EOSINOPHIL NFR BLD: 2.8 % (ref 0–6.9)
ERYTHROCYTE [DISTWIDTH] IN BLOOD BY AUTOMATED COUNT: 41.1 FL (ref 35.9–50)
GLUCOSE SERPL-MCNC: 124 MG/DL (ref 65–99)
HCT VFR BLD AUTO: 41.8 % (ref 42–52)
HGB BLD-MCNC: 14.6 G/DL (ref 14–18)
IMM GRANULOCYTES # BLD AUTO: 0.07 K/UL (ref 0–0.11)
IMM GRANULOCYTES NFR BLD AUTO: 0.8 % (ref 0–0.9)
LYMPHOCYTES # BLD AUTO: 1.74 K/UL (ref 1–4.8)
LYMPHOCYTES NFR BLD: 19.6 % (ref 22–41)
MCH RBC QN AUTO: 30.6 PG (ref 27–33)
MCHC RBC AUTO-ENTMCNC: 34.9 G/DL (ref 33.7–35.3)
MCV RBC AUTO: 87.6 FL (ref 81.4–97.8)
MONOCYTES # BLD AUTO: 0.78 K/UL (ref 0–0.85)
MONOCYTES NFR BLD AUTO: 8.8 % (ref 0–13.4)
NEUTROPHILS # BLD AUTO: 6.02 K/UL (ref 1.82–7.42)
NEUTROPHILS NFR BLD: 67.7 % (ref 44–72)
NRBC # BLD AUTO: 0 K/UL
NRBC BLD-RTO: 0 /100 WBC
PLATELET # BLD AUTO: 201 K/UL (ref 164–446)
PMV BLD AUTO: 9.3 FL (ref 9–12.9)
POTASSIUM SERPL-SCNC: 3.8 MMOL/L (ref 3.6–5.5)
RBC # BLD AUTO: 4.77 M/UL (ref 4.7–6.1)
SODIUM SERPL-SCNC: 132 MMOL/L (ref 135–145)
TROPONIN T SERPL-MCNC: <6 NG/L (ref 6–19)
WBC # BLD AUTO: 8.9 K/UL (ref 4.8–10.8)

## 2020-05-15 PROCEDURE — 80048 BASIC METABOLIC PNL TOTAL CA: CPT

## 2020-05-15 PROCEDURE — 99283 EMERGENCY DEPT VISIT LOW MDM: CPT

## 2020-05-15 PROCEDURE — 71045 X-RAY EXAM CHEST 1 VIEW: CPT

## 2020-05-15 PROCEDURE — 85025 COMPLETE CBC W/AUTO DIFF WBC: CPT

## 2020-05-15 PROCEDURE — 84484 ASSAY OF TROPONIN QUANT: CPT

## 2020-05-15 PROCEDURE — 93005 ELECTROCARDIOGRAM TRACING: CPT | Performed by: EMERGENCY MEDICINE

## 2020-05-15 RX ORDER — HYDROXYZINE HYDROCHLORIDE 25 MG/1
25 TABLET, FILM COATED ORAL 3 TIMES DAILY PRN
Qty: 15 TAB | Refills: 0 | Status: SHIPPED | OUTPATIENT
Start: 2020-05-15

## 2020-05-15 ASSESSMENT — FIBROSIS 4 INDEX: FIB4 SCORE: 1.91

## 2020-05-16 LAB — EKG IMPRESSION: NORMAL

## 2020-05-16 NOTE — ED PROVIDER NOTES
"ED Provider Note    CHIEF COMPLAINT  Chief Complaint   Patient presents with   • Anxiety     x3 days       HPI  Jamil Orozco is a 40 y.o. male who presents with 3 days of anxiety that has been constant throughout that time.  Also notes some subtle chest pressure over the past day.  No vomiting.  No diaphoresis.  No exertional component.  No shortness of breath.  No fever or cough.  No known prior cardiovascular disease history.  He has had a shot of in [place] in the past week which is a chronic medication for him for his schizophrenia.  No lower extremity swelling or discomfort.    REVIEW OF SYSTEMS  See HPI for further details. All other systems are negative.     PAST MEDICAL HISTORY   has a past medical history of Schizophrenia (HCC).    SOCIAL HISTORY  Social History     Tobacco Use   • Smoking status: Current Every Day Smoker     Packs/day: 0.50     Types: Cigarettes   • Smokeless tobacco: Never Used   Substance and Sexual Activity   • Alcohol use: No   • Drug use: No   • Sexual activity: Not on file       SURGICAL HISTORY  patient denies any surgical history    CURRENT MEDICATIONS  Home Medications    **Home medications have not yet been reviewed for this encounter**         ALLERGIES  No Known Allergies    PHYSICAL EXAM  VITAL SIGNS: /90   Pulse 98   Temp 36.4 °C (97.6 °F) (Temporal)   Resp 16   Ht 1.727 m (5' 8\")   Wt 69.2 kg (152 lb 8.9 oz)   SpO2 95%   BMI 23.20 kg/m²   Pulse ox interpretation: I interpret this pulse ox as normal.  Constitutional: Alert in no apparent distress.  HENT: No signs of trauma, Bilateral external ears normal, Nose normal.   Eyes: Pupils are equal and reactive, Conjunctiva normal, Non-icteric.   Neck: Normal range of motion, No tenderness, Supple, No stridor.   Cardiovascular: Regular rate and rhythm.   Thorax & Lungs: Normal breath sounds, No respiratory distress, No wheezing, No chest tenderness.   Abdomen: Bowel sounds normal, Soft, No tenderness, No masses, No " pulsatile masses. No peritoneal signs.  Skin: Warm, Dry, No erythema, No rash.   Back: No bony tenderness, No CVA tenderness.   Extremities: Intact distal pulses, No edema, No tenderness, No cyanosis  Neurologic: Alert, No focal deficits noted.       DIAGNOSTIC STUDIES / PROCEDURES    EKG - Physician interpretation  Results for orders placed or performed during the hospital encounter of 05/15/20   EKG   Result Value Ref Range    Report       [facility] Emergency Dept.    Test Date:  2020-05-15  Pt Name:    DIANNA FU              Department: ER  MRN:        [id number]                      Room:       [facility]  Gender:     Male                         Technician: [id number]  :        1980                   Requested By:ER TRIAGE PROTOCOL  Order #:    [id number]                    Reading MD: ANNABELLE IGNACIO MD    Measurements  Intervals                                Axis  Rate:       83                           P:          42  IA:         188                          QRS:        -30  QRSD:       90                           T:          48  QT:         356  QTc:        419    Interpretive Statements  SINUS RHYTHM  LEFT AXIS DEVIATION  RSR' IN V1 OR V2, PROBABLY NORMAL VARIANT  No previous ECG available for comparison  Electronically Signed On 2020 1:13:54 PDT by ANNABELLE IGNACIO MD           LABS  Labs Reviewed   CBC WITH DIFFERENTIAL - Abnormal; Notable for the following components:       Result Value    Hematocrit 41.8 (*)     Lymphocytes 19.60 (*)     All other components within normal limits   BASIC METABOLIC PANEL - Abnormal; Notable for the following components:    Sodium 132 (*)     Chloride 94 (*)     Glucose 124 (*)     All other components within normal limits   TROPONIN   ESTIMATED GFR       RADIOLOGY  DX-CHEST-PORTABLE (1 VIEW)   Final Result      No acute cardiopulmonary disease evident.          COURSE & MEDICAL DECISION MAKING    Medications - No data to display    Pertinent Labs &  "Imaging studies reviewed. (See chart for details)  40 y.o. male presenting with anxiety and chest pressure.  Anxiety for 3 days.  Chest pressure started yesterday.  No diaphoresis or vomiting.  No exertional component.  No shortness of breath.  Pulmonary embolism rule out criteria negative.  Heart score of 1 given risk factors.  EKG without signs of acute ischemia.  Troponin is negative.  Laboratory studies are largely unremarkable.  Patient does have known underlying psychiatric illness including schizophrenia.  Unclear if the anxiety symptoms are related to recent administration of invega.  Patient is not in any obvious psychiatric distress at this time.  We will attempt treatment with hydroxyzine to see if this helps with his symptoms.  Encouraged to follow-up with a primary care physician for further management.  To return immediately for any worsening of symptoms or development of any other concerning signs or symptoms.    The patient was instructed to follow-up with primary care physician for further management.  To return immediately for any worsening symptoms or development of any other concerning signs or symptoms. The patient verbalizes understanding in their own words.    /76   Pulse 80   Temp 37 °C (98.6 °F) (Oral)   Resp 16   Ht 1.727 m (5' 8\")   Wt 69.2 kg (152 lb 8.9 oz)   SpO2 98%   BMI 23.20 kg/m²     The patient was referred to primary care where they will receive further BP management.      [facility], Emergency Dept  [address] [facility] [address] [phone]    As needed, If symptoms worsen    Primary care doctor    Schedule an appointment as soon as possible for a visit         FINAL IMPRESSION  1. Anxiety    2. Chest pain, unspecified type            Electronically signed by: Delvis Pennington M.D., 5/15/2020 10:01 PM    "

## 2020-05-16 NOTE — ED TRIAGE NOTES
Chief Complaint   Patient presents with   • Anxiety     x3 days     Pt reports worsening anxiety over the past 3 days. Pt denies drug and alcohol use. Pt has hx of schizophrenia. Denies SI/HI.

## 2023-08-26 ENCOUNTER — HOSPITAL ENCOUNTER (EMERGENCY)
Facility: MEDICAL CENTER | Age: 43
End: 2023-08-26
Attending: EMERGENCY MEDICINE
Payer: COMMERCIAL

## 2023-08-26 VITALS
RESPIRATION RATE: 16 BRPM | HEIGHT: 68 IN | TEMPERATURE: 98.4 F | HEART RATE: 78 BPM | WEIGHT: 120 LBS | OXYGEN SATURATION: 95 % | SYSTOLIC BLOOD PRESSURE: 120 MMHG | BODY MASS INDEX: 18.19 KG/M2 | DIASTOLIC BLOOD PRESSURE: 65 MMHG

## 2023-08-26 DIAGNOSIS — K02.9 DENTAL CARIES: ICD-10-CM

## 2023-08-26 DIAGNOSIS — F10.920 ALCOHOLIC INTOXICATION WITHOUT COMPLICATION (HCC): ICD-10-CM

## 2023-08-26 PROCEDURE — A9270 NON-COVERED ITEM OR SERVICE: HCPCS | Mod: UD | Performed by: EMERGENCY MEDICINE

## 2023-08-26 PROCEDURE — 700102 HCHG RX REV CODE 250 W/ 637 OVERRIDE(OP): Mod: UD | Performed by: EMERGENCY MEDICINE

## 2023-08-26 PROCEDURE — 99283 EMERGENCY DEPT VISIT LOW MDM: CPT

## 2023-08-26 RX ADMIN — THERA TABS 1 TABLET: TAB at 03:18

## 2023-08-26 NOTE — ED PROVIDER NOTES
"ED Provider Note    CHIEF COMPLAINT  Chief Complaint   Patient presents with    Alcohol Intoxication     Patient was found in front of 7/11, initially unresponsive upon EMS arrival and spontaneously woke up without any intervention. AOX4 GCS15; not SI/HI. History of meth/Fentanyl use.           HPI/ROS  LIMITATION TO HISTORY   Select: : None  OUTSIDE HISTORIAN(S):  EMS patient was sleeping outside 711.  Glucose was normal for them    Jamil Orozco is a 43 y.o. male who presents to the emerged part with chief complaint of alcohol intoxication.  Patient states he has been drinking but he was just sleeping when he was woken up.  He is not complaining of any pain but he states he is got some bad dentition and is hoping for a multivitamin and something to eat.  He states that he has not eaten in several days.    PAST MEDICAL HISTORY   has a past medical history of Schizophrenia (HCC).    SURGICAL HISTORY  patient denies any surgical history    FAMILY HISTORY  No family history on file.    SOCIAL HISTORY  Social History     Tobacco Use    Smoking status: Every Day     Current packs/day: 0.50     Types: Cigarettes    Smokeless tobacco: Never   Substance and Sexual Activity    Alcohol use: No    Drug use: No    Sexual activity: Not on file       CURRENT MEDICATIONS  Home Medications       Reviewed by Emilie Burnett R.N. (Registered Nurse) on 08/26/23 at 0229  Med List Status: <None>     Medication Last Dose Status   hydrOXYzine HCl (ATARAX) 25 MG Tab  Active                    ALLERGIES  No Known Allergies    PHYSICAL EXAM  VITAL SIGNS: /86   Pulse 77   Temp 36 °C (96.8 °F) (Temporal)   Resp 18   Ht 1.727 m (5' 8\")   Wt 54.4 kg (120 lb)   SpO2 94%   BMI 18.25 kg/m²    Pulse OX: Pulse Oxygen level is normal limits  Constitutional: Alert in no apparent distress.  Disheveled  HENT: Normocephalic, Atraumatic, MMM multiple dental caries but oropharynx clear no facial swelling no evidence of fluctuance along the " "gum lines  Eyes: PERound. Conjunctiva normal, non-icteric.   Heart: Regular rate and rhythm, intact distal pulses   Lungs: Symmetrical movement, no resp distress   Abdomen: Non-tender, non-distended, normal bowel sounds  Skin: Warm, Dry, No erythema, No rash.   Neurologic: Alert and oriented, Grossly non-focal.       DIAGNOSTIC STUDIES / PROCEDURES      COURSE & MEDICAL DECISION MAKING    ED Observation Status? No    INITIAL ASSESSMENT, COURSE AND PLAN/ DISPOSITION AND DISCUSSIONS  Care Narrative:  Patient is alert ambulatory has no real complaints beyond being homeless and hungry.  He is a little labile in his mood but otherwise there is no indication for blood analysis he will be given a multivitamin as he requested as well as a diet and then discharged with referral for his dental caries.    I did come by the patient around 345 after eating he is trying to make himself vomit stating that he needs to get his stomach acid out because \"it makes him feel better\".  He is not feeling nauseated and states he is got some reflux I offered him medications to help with this but he told me to just leave him alone and let him take care of it.      I have discussed management of the patient with the following physicians and MARIELLA's:  none    Discussion of management with other QHP or appropriate source(s): None     Escalation of care considered, and ultimately not performed:blood analysis and diagnostic imaging    Barriers to care at this time, including but not limited to: Patient is homeless.     Decision tools and prescription drugs considered including, but not limited to:  we discussed OTC meds for reflux he did not want a prescription today  .    The patient will return for new or worsening symptoms and is stable at the time of discharge.    The patient is referred to a primary physician for blood pressure management, diabetic screening, and for all other preventative health concerns.    DISPOSITION:  Patient will be " discharged home in stable condition.    FOLLOW UP:  [facility], Emergency Dept  [address] [address] [phone]    If symptoms worsen      OUTPATIENT MEDICATIONS:  Discharge Medication List as of 8/26/2023  4:06 AM            FINAL DIAGNOSIS  1. Alcoholic intoxication without complication (HCC)    2. Dental caries           Electronically signed by: Lyla Bejarano M.D., 8/26/2023 2:38 AM

## 2023-08-26 NOTE — ED NOTES
"Patient purging at bedside; confronted him and got upset, states \" give me some space and I'm not talking to you coz' you are not my doctor! I need to all the acid inside my body!  "

## 2023-08-26 NOTE — ED TRIAGE NOTES
"Chief Complaint   Patient presents with    Alcohol Intoxication     Patient was found in front of 7/11, initially unresponsive upon EMS arrival and spontaneously woke up without any intervention. AOX4 GCS15; not SI/HI. History of meth/Fentanyl use.     IV on left AC g20; received 100ML of NS en route; patient is talking continuously; cooperative.    /86   Pulse 77   Temp 36 °C (96.8 °F) (Temporal)   Resp 18   Ht 1.727 m (5' 8\")   Wt 54.4 kg (120 lb)   SpO2 94%   BMI 18.25 kg/m²     "

## 2023-08-26 NOTE — ED NOTES
Discharge paper given to patient; escorted by security to lobby; with steady gait; Aox4 GCS 15; all belongings with patient

## 2024-06-15 ENCOUNTER — HOSPITAL ENCOUNTER (EMERGENCY)
Facility: MEDICAL CENTER | Age: 44
End: 2024-06-15
Attending: STUDENT IN AN ORGANIZED HEALTH CARE EDUCATION/TRAINING PROGRAM
Payer: COMMERCIAL

## 2024-06-15 ENCOUNTER — PHARMACY VISIT (OUTPATIENT)
Dept: PHARMACY | Facility: MEDICAL CENTER | Age: 44
End: 2024-06-15
Payer: COMMERCIAL

## 2024-06-15 VITALS
WEIGHT: 212.96 LBS | TEMPERATURE: 97.4 F | SYSTOLIC BLOOD PRESSURE: 130 MMHG | HEIGHT: 68 IN | DIASTOLIC BLOOD PRESSURE: 85 MMHG | OXYGEN SATURATION: 94 % | HEART RATE: 89 BPM | RESPIRATION RATE: 15 BRPM | BODY MASS INDEX: 32.28 KG/M2

## 2024-06-15 DIAGNOSIS — R22.0 SWELLING OF UPPER LIP: ICD-10-CM

## 2024-06-15 DIAGNOSIS — T78.3XXA ANGIOEDEMA, INITIAL ENCOUNTER: ICD-10-CM

## 2024-06-15 PROCEDURE — 99284 EMERGENCY DEPT VISIT MOD MDM: CPT

## 2024-06-15 PROCEDURE — 96375 TX/PRO/DX INJ NEW DRUG ADDON: CPT

## 2024-06-15 PROCEDURE — A9270 NON-COVERED ITEM OR SERVICE: HCPCS | Mod: UD | Performed by: STUDENT IN AN ORGANIZED HEALTH CARE EDUCATION/TRAINING PROGRAM

## 2024-06-15 PROCEDURE — 700111 HCHG RX REV CODE 636 W/ 250 OVERRIDE (IP): Mod: UD | Performed by: STUDENT IN AN ORGANIZED HEALTH CARE EDUCATION/TRAINING PROGRAM

## 2024-06-15 PROCEDURE — RXMED WILLOW AMBULATORY MEDICATION CHARGE: Performed by: STUDENT IN AN ORGANIZED HEALTH CARE EDUCATION/TRAINING PROGRAM

## 2024-06-15 PROCEDURE — 96374 THER/PROPH/DIAG INJ IV PUSH: CPT

## 2024-06-15 PROCEDURE — 700102 HCHG RX REV CODE 250 W/ 637 OVERRIDE(OP): Mod: UD | Performed by: STUDENT IN AN ORGANIZED HEALTH CARE EDUCATION/TRAINING PROGRAM

## 2024-06-15 RX ORDER — AMOXICILLIN AND CLAVULANATE POTASSIUM 875; 125 MG/1; MG/1
1 TABLET, FILM COATED ORAL ONCE
Status: COMPLETED | OUTPATIENT
Start: 2024-06-15 | End: 2024-06-15

## 2024-06-15 RX ORDER — AMOXICILLIN AND CLAVULANATE POTASSIUM 875; 125 MG/1; MG/1
1 TABLET, FILM COATED ORAL 2 TIMES DAILY
Qty: 14 TABLET | Refills: 0 | Status: SHIPPED | OUTPATIENT
Start: 2024-06-15 | End: 2024-06-15

## 2024-06-15 RX ORDER — DIPHENHYDRAMINE HYDROCHLORIDE 50 MG/ML
25 INJECTION INTRAMUSCULAR; INTRAVENOUS ONCE
Status: COMPLETED | OUTPATIENT
Start: 2024-06-15 | End: 2024-06-15

## 2024-06-15 RX ORDER — AMOXICILLIN AND CLAVULANATE POTASSIUM 875; 125 MG/1; MG/1
1 TABLET, FILM COATED ORAL 2 TIMES DAILY
Qty: 14 TABLET | Refills: 0 | Status: ACTIVE | OUTPATIENT
Start: 2024-06-15 | End: 2024-06-22

## 2024-06-15 RX ORDER — DEXAMETHASONE SODIUM PHOSPHATE 4 MG/ML
10 INJECTION, SOLUTION INTRA-ARTICULAR; INTRALESIONAL; INTRAMUSCULAR; INTRAVENOUS; SOFT TISSUE ONCE
Status: COMPLETED | OUTPATIENT
Start: 2024-06-15 | End: 2024-06-15

## 2024-06-15 RX ADMIN — DIPHENHYDRAMINE HYDROCHLORIDE 25 MG: 50 INJECTION, SOLUTION INTRAMUSCULAR; INTRAVENOUS at 01:58

## 2024-06-15 RX ADMIN — DEXAMETHASONE SODIUM PHOSPHATE 10 MG: 4 INJECTION INTRA-ARTICULAR; INTRALESIONAL; INTRAMUSCULAR; INTRAVENOUS; SOFT TISSUE at 01:59

## 2024-06-15 RX ADMIN — AMOXICILLIN AND CLAVULANATE POTASSIUM 1 TABLET: 875; 125 TABLET, FILM COATED ORAL at 04:47

## 2024-06-15 NOTE — ED PROVIDER NOTES
ED Provider Note    CHIEF COMPLAINT  Chief Complaint   Patient presents with    Lip Swelling     Pt has swelling noted to L side of upper lip. Pt denies trauma or any new foods/etc.       EXTERNAL RECORDS REVIEWED  Other medication records reviewed.  Patient not on any ACE inhibitors.    HPI/ROS  LIMITATION TO HISTORY   Select: : None      Jamil Orozco is a 44 y.o. male who presents to the emergency department for evaluation of upper lip swelling.  He reports swelling to the right upper lip started approximately 1 hour prior to arrival and has since spread to the left upper lip.  He denies any swelling of the lower lip or any swelling in the mouth including the tongue or throat.  He denies difficulty breathing, difficulty swallowing.  He denies any rash or hives, nausea or vomiting, wheezing, dizziness lightheadedness or passing out.  He reports that he has a history of tooth decay and has not seen a dentist in some time.  He denies any significant dental pain, fevers or chills.  He does not take any medications regularly.  He has never had something like this happen before.  He denies a family history of angioedema.    PAST MEDICAL HISTORY   has a past medical history of Schizophrenia ([facility]).    SURGICAL HISTORY  patient denies any surgical history    FAMILY HISTORY  No history of angioedema    SOCIAL HISTORY  Social History     Tobacco Use    Smoking status: Every Day     Current packs/day: 0.50     Types: Cigarettes    Smokeless tobacco: Never   Substance and Sexual Activity    Alcohol use: No    Drug use: No    Sexual activity: Not on file       CURRENT MEDICATIONS  Home Medications       Reviewed by Lorena Lane R.N. (Registered Nurse) on 06/15/24 at 0035  Med List Status: Partial     Medication Last Dose Status   hydrOXYzine HCl (ATARAX) 25 MG Tab  Active                    ALLERGIES  No Known Allergies    PHYSICAL EXAM  VITAL SIGNS: /75   Pulse 85   Temp 35.8 °C (96.5 °F) (Temporal)   Resp 19  "  Ht 1.727 m (5' 8\")   Wt 96.6 kg (212 lb 15.4 oz)   SpO2 92%   BMI 32.38 kg/m²    Constitutional: No acute distress, pleasant and well-appearing  HEENT: Atraumatic, normocephalic, mild edema of the upper lip.  No additional facial, perioral or intraoral edema.  Oropharynx is otherwise clear, uvula midline.  Extensive dental decay present.  No tenderness with palpation of the teeth or significant gingival erythema edema or discharge.  Neck: Supple, no JVD, no tracheal deviation, no stridor  Cardiovascular: Regular rate and rhythm, no murmur, rub or gallop, 2+ pulses peripherally x4  Thorax & Lungs: No respiratory distress, no wheezes, rales or rhonchi, no chest wall tenderness.  GI: Soft, non-distended, non-tender, no rebound  Skin: Warm, dry, no acute rash or lesion  Musculoskeletal: Moving all extremities, no acute deformity, no edema, no tenderness  Neurologic: A&Ox3, at baseline mentation, cranial nerves II through XII are grossly intact, no sensory deficit, no ataxia  Psychiatric: Appropriate affect for situation at this time        COURSE & MEDICAL DECISION MAKING    ASSESSMENT, COURSE AND PLAN  Care Narrative:     1:40 AM patient presents to the emergency department for evaluation of upper lip swelling with edema present on exam.  Differential includes angioedema, cellulitis, abscess, anaphylaxis.  I feel infectious etiology of symptoms is unlikely given its rapid onset and absence of fever, pain, chills, erythema or purulent discharge from teeth or gingiva.  Anaphylaxis unlikely given absence of any additional secondary symptoms or any new exposures.  Do not feel epinephrine is needed though will treat with oral dexamethasone and Benadryl for potential allergic reaction.  I feel first time angioedema is most likely.  Patient not on any ACE inhibitors  or ARB's and has no known family history of hereditary angioedema.  Will plan for close observation in the department to evaluate for any symptom " progression but currently the patient's airway is not involved.    4:50 AM patient reassessed.  Edema has improved though has not entirely resolved.  Discussed with the patient options including observation in the hospital versus continued monitoring at home and he states that he would prefer the latter which I feel is reasonable given his clinical stability/improvement.  Will trial treatment for potential infectious origin of angioedema given his significant dental decay predisposing him for such.  Will place on Augmentin with first dose provided in the ER.  Patient comfortable with this plan.  I otherwise encouraged her to follow-up with her primary care doctor and follow-up information was provided.  Strict return precautions discussed and all questions answered and he was discharged stable condition.      ADDITIONAL PROBLEMS MANAGED  None    DISPOSITION AND DISCUSSIONS  I have discussed management of the patient with the following physicians and MARIELLA's: None    Discussion of management with other QHP or appropriate source(s): None     Escalation of care considered, and ultimately not performed:acute inpatient care management, however at this time, the patient is most appropriate for outpatient management    Barriers to care at this time, including but not limited to: Patient does not have established PCP.     Decision tools and prescription drugs considered including, but not limited to: Antibiotics Augmentin .    FINAL DIAGNOSIS  1. Swelling of upper lip    2. Angioedema, initial encounter           Electronically signed by: Sammy Hendricks M.D., 6/15/2024 1:43 AM

## 2024-06-15 NOTE — DISCHARGE INSTRUCTIONS
Please take your antibiotics twice daily for the next 7 days.  Please follow-up with your primary care doctor for ongoing evaluation and routine care.    Please return to the ER immediately if your swelling worsens or you develop any difficulty breathing, fever, difficulty opening her mouth or otherwise feeling worse.

## 2024-06-15 NOTE — ED TRIAGE NOTES
Chief Complaint   Patient presents with    Lip Swelling     Pt has swelling noted to L side of upper lip. Pt denies trauma or any new foods/etc.     Pt ambulatory to triage for above complaint. Pt denies and oral or throat swelling.     Pt is alert & oriented and follows commands. Pt speaking in full sentences and responds appropriately to questions. No acute distress noted in triage. Respirations are even and unlabored. Skin is pink/warm/dry.    Pt placed back in lobby and educated on triage process. Pt encouraged to alert staff to any changes in condition.

## 2024-06-24 ENCOUNTER — HOSPITAL ENCOUNTER (EMERGENCY)
Facility: MEDICAL CENTER | Age: 44
End: 2024-06-24
Attending: EMERGENCY MEDICINE
Payer: COMMERCIAL

## 2024-06-24 VITALS
RESPIRATION RATE: 15 BRPM | HEART RATE: 79 BPM | DIASTOLIC BLOOD PRESSURE: 82 MMHG | BODY MASS INDEX: 22.89 KG/M2 | WEIGHT: 151.01 LBS | OXYGEN SATURATION: 96 % | SYSTOLIC BLOOD PRESSURE: 124 MMHG | TEMPERATURE: 97.6 F | HEIGHT: 68 IN

## 2024-06-24 DIAGNOSIS — F41.9 ANXIETY: ICD-10-CM

## 2024-06-24 DIAGNOSIS — Z87.828 HISTORY OF HEAD INJURY: ICD-10-CM

## 2024-06-24 DIAGNOSIS — F20.9 SCHIZOPHRENIA, UNSPECIFIED TYPE (HCC): ICD-10-CM

## 2024-06-24 PROCEDURE — 99283 EMERGENCY DEPT VISIT LOW MDM: CPT
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
## 2024-06-24 NOTE — ED NOTES
Pt ambulated to room Yellow 60 with steady gait. Pt changed into gown and placed on SpO2 and BP monitors. Chart up for ERP.

## 2024-06-24 NOTE — ED NOTES
Pt discharged home as ordered by erp. Pt instructed to follow up with their PCP and return here as need. Pt verbalized understanding and left ambulating independently

## 2024-06-24 NOTE — DISCHARGE INSTRUCTIONS
Do not need to worry about your remote head injuries.  Of course do not try to go out of your way to obtain another head injury.  The appropriate place for routine labs and preventative care is the medical clinic.  Please call the [facility] [place] for an appointment with internal medicine or family medicine.

## 2024-07-19 ENCOUNTER — HOSPITAL ENCOUNTER (EMERGENCY)
Facility: MEDICAL CENTER | Age: 44
End: 2024-07-19
Attending: EMERGENCY MEDICINE
Payer: COMMERCIAL

## 2024-07-19 ENCOUNTER — PHARMACY VISIT (OUTPATIENT)
Dept: PHARMACY | Facility: MEDICAL CENTER | Age: 44
End: 2024-07-19
Payer: COMMERCIAL

## 2024-07-19 VITALS
WEIGHT: 138.89 LBS | DIASTOLIC BLOOD PRESSURE: 76 MMHG | TEMPERATURE: 97 F | HEART RATE: 65 BPM | RESPIRATION RATE: 14 BRPM | OXYGEN SATURATION: 97 % | HEIGHT: 68 IN | SYSTOLIC BLOOD PRESSURE: 112 MMHG | BODY MASS INDEX: 21.05 KG/M2

## 2024-07-19 DIAGNOSIS — R60.0 ARM EDEMA: ICD-10-CM

## 2024-07-19 DIAGNOSIS — K04.7 DENTAL INFECTION: ICD-10-CM

## 2024-07-19 PROCEDURE — A9270 NON-COVERED ITEM OR SERVICE: HCPCS | Mod: UD | Performed by: EMERGENCY MEDICINE

## 2024-07-19 PROCEDURE — RXMED WILLOW AMBULATORY MEDICATION CHARGE: Performed by: EMERGENCY MEDICINE

## 2024-07-19 PROCEDURE — 99283 EMERGENCY DEPT VISIT LOW MDM: CPT

## 2024-07-19 PROCEDURE — 700102 HCHG RX REV CODE 250 W/ 637 OVERRIDE(OP): Mod: UD | Performed by: EMERGENCY MEDICINE

## 2024-07-19 RX ORDER — AMOXICILLIN AND CLAVULANATE POTASSIUM 875; 125 MG/1; MG/1
1 TABLET, FILM COATED ORAL 2 TIMES DAILY
Qty: 10 TABLET | Refills: 0 | Status: ACTIVE | OUTPATIENT
Start: 2024-07-19 | End: 2024-07-24

## 2024-07-19 RX ORDER — DIPHENHYDRAMINE HCL 25 MG
50 TABLET ORAL ONCE
Status: COMPLETED | OUTPATIENT
Start: 2024-07-19 | End: 2024-07-19

## 2024-07-19 RX ORDER — FAMOTIDINE 20 MG/1
20 TABLET, FILM COATED ORAL ONCE
Status: COMPLETED | OUTPATIENT
Start: 2024-07-19 | End: 2024-07-19

## 2024-07-19 RX ORDER — METHYLPREDNISOLONE 4 MG/1
TABLET ORAL
Qty: 21 EACH | Refills: 0 | Status: SHIPPED | OUTPATIENT
Start: 2024-07-19

## 2024-07-19 RX ADMIN — DIPHENHYDRAMINE HYDROCHLORIDE 50 MG: 25 TABLET ORAL at 09:11

## 2024-07-19 RX ADMIN — FAMOTIDINE 20 MG: 20 TABLET, FILM COATED ORAL at 09:11

## 2024-07-19 ASSESSMENT — PAIN DESCRIPTION - PAIN TYPE: TYPE: ACUTE PAIN

## 2024-07-30 ENCOUNTER — HOSPITAL ENCOUNTER (EMERGENCY)
Facility: MEDICAL CENTER | Age: 44
End: 2024-07-30
Payer: COMMERCIAL

## 2024-07-30 VITALS
DIASTOLIC BLOOD PRESSURE: 74 MMHG | OXYGEN SATURATION: 98 % | BODY MASS INDEX: 20.72 KG/M2 | WEIGHT: 136.69 LBS | HEIGHT: 68 IN | TEMPERATURE: 97.9 F | RESPIRATION RATE: 16 BRPM | SYSTOLIC BLOOD PRESSURE: 104 MMHG | HEART RATE: 104 BPM

## 2024-07-30 PROCEDURE — 302449 STATCHG TRIAGE ONLY (STATISTIC)

## 2024-07-31 NOTE — ED NOTES
"Pt asking for a cup for water, pt educated on NPO status until seen by ERP. Pt then ambulatory toward restroom, however left all belongings behind. Pt asked by staff not to leave belongings unattended, pt then stated \"fuck you\". Pt then ambulatory with steady gait out of ED, when asked by this tech if pt is leaving pt stated \"fuck yeah fuck you\". Security in lobby for assistance.   "

## 2024-07-31 NOTE — ED TRIAGE NOTES
Jamil Orozco .  44 y.o. male  Chief Complaint   Patient presents with    Arm Pain     Right arm pain after sleeping on it then reports working out for 20 hours. Patient swinging fist in the air in triage .NOT an attempt to hurt staff.     Pt ambulatory to triage with steady gait for above complaint.     Pt is GCS 15, speaking in full sentences, follows commands and responds appropriately to questions. Resp are even and unlabored.    Pt placed in ED lobby. Pt educated on triage process. Pt encouraged to alert staff for any changes.       Vitals:    07/30/24 1812   BP: 104/74   Pulse: (!) 104   Resp: 16   Temp: 36.6 °C (97.9 °F)   SpO2: 98%

## 2024-08-03 ENCOUNTER — HOSPITAL ENCOUNTER (EMERGENCY)
Facility: MEDICAL CENTER | Age: 44
End: 2024-08-03
Attending: EMERGENCY MEDICINE
Payer: COMMERCIAL

## 2024-08-03 VITALS
SYSTOLIC BLOOD PRESSURE: 118 MMHG | OXYGEN SATURATION: 95 % | WEIGHT: 136 LBS | DIASTOLIC BLOOD PRESSURE: 74 MMHG | HEART RATE: 72 BPM | RESPIRATION RATE: 15 BRPM | TEMPERATURE: 98 F | BODY MASS INDEX: 20.68 KG/M2

## 2024-08-03 DIAGNOSIS — S09.90XA CLOSED HEAD INJURY, INITIAL ENCOUNTER: ICD-10-CM

## 2024-08-03 DIAGNOSIS — S01.01XA LACERATION OF SCALP, INITIAL ENCOUNTER: ICD-10-CM

## 2024-08-03 PROCEDURE — 700111 HCHG RX REV CODE 636 W/ 250 OVERRIDE (IP): Performed by: EMERGENCY MEDICINE

## 2024-08-03 PROCEDURE — 90715 TDAP VACCINE 7 YRS/> IM: CPT | Performed by: EMERGENCY MEDICINE

## 2024-08-03 PROCEDURE — 304999 HCHG REPAIR-SIMPLE/INTERMED LEVEL 1

## 2024-08-03 PROCEDURE — 99282 EMERGENCY DEPT VISIT SF MDM: CPT | Mod: 25

## 2024-08-03 PROCEDURE — 304217 HCHG IRRIGATION SYSTEM

## 2024-08-03 PROCEDURE — 90471 IMMUNIZATION ADMIN: CPT

## 2024-08-03 PROCEDURE — 305308 HCHG STAPLER,SKIN,DISP.

## 2024-08-03 PROCEDURE — 700101 HCHG RX REV CODE 250: Mod: UD | Performed by: EMERGENCY MEDICINE

## 2024-08-03 RX ADMIN — CLOSTRIDIUM TETANI TOXOID ANTIGEN (FORMALDEHYDE INACTIVATED), CORYNEBACTERIUM DIPHTHERIAE TOXOID ANTIGEN (FORMALDEHYDE INACTIVATED), BORDETELLA PERTUSSIS TOXOID ANTIGEN (GLUTARALDEHYDE INACTIVATED), BORDETELLA PERTUSSIS FILAMENTOUS HEMAGGLUTININ ANTIGEN (FORMALDEHYDE INACTIVATED), BORDETELLA PERTUSSIS PERTACTIN ANTIGEN, AND BORDETELLA PERTUSSIS FIMBRIAE 2/3 ANTIGEN 0.5 ML: 5; 2; 2.5; 5; 3; 5 INJECTION, SUSPENSION INTRAMUSCULAR at 10:13

## 2024-08-03 RX ADMIN — LIDOCAINE HYDROCHLORIDE 10 ML: 10; .005 INJECTION, SOLUTION EPIDURAL; INFILTRATION; INTRACAUDAL; PERINEURAL at 09:43
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
## 2024-08-11 ENCOUNTER — HOSPITAL ENCOUNTER (EMERGENCY)
Facility: MEDICAL CENTER | Age: 44
End: 2024-08-11
Attending: EMERGENCY MEDICINE
Payer: COMMERCIAL

## 2024-08-11 ENCOUNTER — PHARMACY VISIT (OUTPATIENT)
Dept: PHARMACY | Facility: MEDICAL CENTER | Age: 44
End: 2024-08-11
Payer: COMMERCIAL

## 2024-08-11 VITALS
DIASTOLIC BLOOD PRESSURE: 75 MMHG | RESPIRATION RATE: 18 BRPM | TEMPERATURE: 98.3 F | HEART RATE: 98 BPM | HEIGHT: 68 IN | OXYGEN SATURATION: 97 % | WEIGHT: 132.28 LBS | SYSTOLIC BLOOD PRESSURE: 117 MMHG | BODY MASS INDEX: 20.05 KG/M2

## 2024-08-11 DIAGNOSIS — Z48.02 ENCOUNTER FOR STAPLE REMOVAL: ICD-10-CM

## 2024-08-11 DIAGNOSIS — T14.8XXA WOUND INFECTION: ICD-10-CM

## 2024-08-11 DIAGNOSIS — L08.9 WOUND INFECTION: ICD-10-CM

## 2024-08-11 PROCEDURE — 304217 HCHG IRRIGATION SYSTEM

## 2024-08-11 PROCEDURE — 700102 HCHG RX REV CODE 250 W/ 637 OVERRIDE(OP): Mod: UD | Performed by: EMERGENCY MEDICINE

## 2024-08-11 PROCEDURE — A9270 NON-COVERED ITEM OR SERVICE: HCPCS | Mod: UD | Performed by: EMERGENCY MEDICINE

## 2024-08-11 PROCEDURE — RXMED WILLOW AMBULATORY MEDICATION CHARGE: Performed by: EMERGENCY MEDICINE

## 2024-08-11 PROCEDURE — 99281 EMR DPT VST MAYX REQ PHY/QHP: CPT | Mod: EDC

## 2024-08-11 PROCEDURE — 99283 EMERGENCY DEPT VISIT LOW MDM: CPT

## 2024-08-11 PROCEDURE — 15853 REMOVAL SUTR/STAPL XREQ ANES: CPT

## 2024-08-11 RX ORDER — SULFAMETHOXAZOLE/TRIMETHOPRIM 800-160 MG
1 TABLET ORAL ONCE
Status: COMPLETED | OUTPATIENT
Start: 2024-08-11 | End: 2024-08-11

## 2024-08-11 RX ORDER — AMOXICILLIN 500 MG/1
500 CAPSULE ORAL ONCE
Status: COMPLETED | OUTPATIENT
Start: 2024-08-11 | End: 2024-08-11

## 2024-08-11 RX ORDER — AMOXICILLIN 500 MG/1
500 CAPSULE ORAL 3 TIMES DAILY
Qty: 21 CAPSULE | Refills: 0 | Status: ACTIVE | OUTPATIENT
Start: 2024-08-11 | End: 2024-08-13 | Stop reason: SDUPTHER

## 2024-08-11 RX ORDER — SULFAMETHOXAZOLE/TRIMETHOPRIM 800-160 MG
1 TABLET ORAL 2 TIMES DAILY
Qty: 14 TABLET | Refills: 0 | Status: ACTIVE | OUTPATIENT
Start: 2024-08-11 | End: 2024-08-18

## 2024-08-11 RX ADMIN — AMOXICILLIN 500 MG: 500 CAPSULE ORAL at 08:57

## 2024-08-11 RX ADMIN — SULFAMETHOXAZOLE AND TRIMETHOPRIM 1 TABLET: 800; 160 TABLET ORAL at 08:57

## 2024-08-11 ASSESSMENT — PAIN DESCRIPTION - PAIN TYPE: TYPE: ACUTE PAIN

## 2024-08-11 NOTE — DISCHARGE INSTRUCTIONS
Keep wound clean and dry.  Wash it out daily.  Return for pain, swelling, more pus drained from the wound or if you have fever or other concerns.  Take antibiotics as prescribed.  Return to the ER in 48 hours for a wound check.  Return sooner if you are worse.

## 2024-08-11 NOTE — ED NOTES
Pt discharged to home. Pt provided education and discharge instructions per ERP. Pt ambulatory out of ED with steady gait. Given directions to [facility].

## 2024-08-11 NOTE — ED NOTES
Chief Complaint   Patient presents with    Staple Removal     Head      Pt ambulated to triage here for staples removal . Crusted yellow covering pt's wound. Attempt to irrigate area, yellow discharge noted.  Initial , rechecked 104.
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
## 2024-08-13 ENCOUNTER — HOSPITAL ENCOUNTER (EMERGENCY)
Facility: MEDICAL CENTER | Age: 44
End: 2024-08-13
Attending: STUDENT IN AN ORGANIZED HEALTH CARE EDUCATION/TRAINING PROGRAM
Payer: COMMERCIAL

## 2024-08-13 VITALS
BODY MASS INDEX: 20.05 KG/M2 | TEMPERATURE: 97.5 F | HEIGHT: 68 IN | DIASTOLIC BLOOD PRESSURE: 70 MMHG | OXYGEN SATURATION: 95 % | SYSTOLIC BLOOD PRESSURE: 125 MMHG | RESPIRATION RATE: 16 BRPM | HEART RATE: 78 BPM | WEIGHT: 132.28 LBS

## 2024-08-13 DIAGNOSIS — R21 RASH: ICD-10-CM

## 2024-08-13 DIAGNOSIS — K08.89 PAIN, DENTAL: ICD-10-CM

## 2024-08-13 PROCEDURE — A9270 NON-COVERED ITEM OR SERVICE: HCPCS | Mod: UD | Performed by: STUDENT IN AN ORGANIZED HEALTH CARE EDUCATION/TRAINING PROGRAM

## 2024-08-13 PROCEDURE — 700111 HCHG RX REV CODE 636 W/ 250 OVERRIDE (IP): Mod: UD | Performed by: STUDENT IN AN ORGANIZED HEALTH CARE EDUCATION/TRAINING PROGRAM

## 2024-08-13 PROCEDURE — 700102 HCHG RX REV CODE 250 W/ 637 OVERRIDE(OP): Mod: UD | Performed by: STUDENT IN AN ORGANIZED HEALTH CARE EDUCATION/TRAINING PROGRAM

## 2024-08-13 PROCEDURE — 99283 EMERGENCY DEPT VISIT LOW MDM: CPT

## 2024-08-13 RX ORDER — DIPHENHYDRAMINE HCL 25 MG
25 TABLET ORAL ONCE
Status: COMPLETED | OUTPATIENT
Start: 2024-08-13 | End: 2024-08-13

## 2024-08-13 RX ORDER — AMOXICILLIN 500 MG/1
500 CAPSULE ORAL 3 TIMES DAILY
Qty: 21 CAPSULE | Refills: 0 | Status: ACTIVE | OUTPATIENT
Start: 2024-08-13 | End: 2024-08-27

## 2024-08-13 RX ORDER — PREDNISONE 20 MG/1
60 TABLET ORAL ONCE
Status: COMPLETED | OUTPATIENT
Start: 2024-08-13 | End: 2024-08-13

## 2024-08-13 RX ORDER — AMOXICILLIN 500 MG/1
500 CAPSULE ORAL ONCE
Status: COMPLETED | OUTPATIENT
Start: 2024-08-13 | End: 2024-08-13

## 2024-08-13 RX ADMIN — AMOXICILLIN 500 MG: 500 CAPSULE ORAL at 03:04

## 2024-08-13 RX ADMIN — DIPHENHYDRAMINE HYDROCHLORIDE 25 MG: 25 TABLET ORAL at 03:04

## 2024-08-13 RX ADMIN — PREDNISONE 60 MG: 20 TABLET ORAL at 03:04

## 2024-08-13 NOTE — ED TRIAGE NOTES
"Chief Complaint   Patient presents with    Other    Psych Eval   BP (!) 128/91   Pulse 80   Temp 36.2 °C (97.1 °F) (Temporal)   Resp 14   Ht 1.727 m (5' 8\")   Wt 60 kg (132 lb 4.4 oz)   SpO2 95%   BMI 20.11 kg/m²     Pt states \"I came in because my tongue hurts and I swallowed my tongue.\" Speaking in full sentences.     Pt with pressured speech in triage, scratching arms and face during triage assessment. Pt is tangential when asked what he is been seen for.    Pt is in no apparent distress. Respirations equal and unlabored.    "

## 2024-08-13 NOTE — ED PROVIDER NOTES
CHIEF COMPLAINT  Chief Complaint   Patient presents with    Other    Psych Eval       LIMITATION TO HISTORY   Select: None    HPI    Jamil Orozco Jr. is a 44 y.o. male who presents to the Emergency Department presented for evaluation of dental pain as well as an itching pruritic rash on his chest.   states that he has a history of allergies and has had this rash occur in the past is responded well to steroids.  Denies any difficulty breathing, chest pain shortness of breath or abdominal pain.  Was also complaining of dental pain on his left incisor with associated left cheek swelling.  No submandibular swelling.  Fevers difficulty swallowing    OUTSIDE HISTORIAN(S):  Select: None    EXTERNAL RECORDS REVIEWED  Select: Other reviewed prior ED visits does have a prior history of schizophrenia.      PAST MEDICAL HISTORY  Past Medical History:   Diagnosis Date    Schizophrenia ([facility])      .    SURGICAL HISTORY  History reviewed. No pertinent surgical history.      FAMILY HISTORY  No family history on file.       SOCIAL HISTORY  Social History     Socioeconomic History    Marital status: Single     Spouse name: Not on file    Number of children: Not on file    Years of education: Not on file    Highest education level: Not on file   Occupational History    Not on file   Tobacco Use    Smoking status: Every Day     Current packs/day: 0.50     Types: Cigarettes    Smokeless tobacco: Never   Vaping Use    Vaping status: Former   Substance and Sexual Activity    Alcohol use: No    Drug use: Yes     Types: Inhaled    Sexual activity: Not on file   Other Topics Concern    Not on file   Social History Narrative    Not on file     Social Determinants of Health     Financial Resource Strain: Not on file   Food Insecurity: Not on file   Transportation Needs: Not on file   Physical Activity: Not on file   Stress: Not on file   Social Connections: Not on file   Intimate Partner Violence: Not on file   Housing Stability: Not on file  "        CURRENT MEDICATIONS  No current facility-administered medications on file prior to encounter.     Current Outpatient Medications on File Prior to Encounter   Medication Sig Dispense Refill    sulfamethoxazole-trimethoprim (BACTRIM DS) 800-160 MG tablet Take 1 Tablet by mouth 2 times a day for 7 days. 14 Tablet 0    amoxicillin (AMOXIL) 500 MG Cap Take 1 Capsule by mouth 3 times a day for 7 days. 21 Capsule 0    methylPREDNISolone (MEDROL DOSEPAK) 4 MG Tablet Therapy Pack Use as directed 21 Each 0    hydrOXYzine HCl (ATARAX) 25 MG Tab Take 1 Tab by mouth 3 times a day as needed for Anxiety. 15 Tab 0           ALLERGIES  No Known Allergies    PHYSICAL EXAM  VITAL SIGNS:BP (!) 128/91   Pulse 80   Temp 36.2 °C (97.1 °F) (Temporal)   Resp 14   Ht 1.727 m (5' 8\")   Wt 60 kg (132 lb 4.4 oz)   SpO2 95%   BMI 20.11 kg/m²       VITALS - vital signs documented prior to this note have been reviewed and noted,  see EHR  GENERAL - awake and alert, no acute distress  HEENT - normocephalic, atraumatic, moist mucus membranes poor dentition, multiple dental caries, and dental erosions.  No obvious drainable periapical abscess  CARDIOVASCULAR - regular rate and rhythm  PULMONARY - unlabored, no respiratory distress. No audible wheezing or  stridor.  NEUROLOGIC - mental status normal, speech fluid, cognition normal  MUSCULOSKELETAL -no obvious deformity or swelling  DERMATOLOGIC -does have an approximately 3 x 3 cm .  Urticarial lesion on his right anterior chest wall with excoriation marks  PSYCHIATRIC - normal affect, normal concentration      DIAGNOSTIC STUDIES / PROCEDURES        Radiologist interpretation:   No orders to display        COURSE & MEDICAL DECISION MAKING    ED COURSE:    ED Observation Status? no    INTERVENTIONS BY ME:  Medications - No data to display        INITIAL ASSESSMENT, COURSE AND PLAN  Care Narrative: Patient presented for evaluation of a rash as well as dental pain.  On examination, he was " complaining of pain around his left incisor.  No obvious drainable periapical abscess.  There is some mild left cheek swelling but no submandibular swelling difficulty swallowing is nontoxic well-hydrated well-appearing low concern for underlying deep space infection thus labs and imaging were deferred.  Had a secondary complaint of a rash on his right anterior chest wall which she states occurred just prior to arrival.  States he has had this in the past and responded well to steroids.  He was given a dose of Decadron and Benadryl for this.  No current signs of anaphylaxis.  Will discharge the patient with amoxicillin for suspected dental infection encouraged him to follow-up with dentistry return precautions were discussed and he was discharged in stable condition.             ADDITIONAL PROBLEM LIST    DISPOSITION AND DISCUSSIONS      Escalation of care considered, and ultimately not performed:Laboratory analysis and diagnostic imaging    Barriers to care at this time, including but not limited to: Patient does not have established PCP.     Decision tools and prescription drugs considered including, but not limited to:  Amoxicillin .    FINAL DIAGNOSIS  No diagnosis found.   #1 dental pain  2.  Rash       Electronically signed by: Kwaku De Los Santos DO ,2:34 AM 08/13/24

## 2024-08-20 ENCOUNTER — PHARMACY VISIT (OUTPATIENT)
Dept: PHARMACY | Facility: MEDICAL CENTER | Age: 44
End: 2024-08-20
Payer: COMMERCIAL

## 2024-08-20 PROCEDURE — RXMED WILLOW AMBULATORY MEDICATION CHARGE: Performed by: STUDENT IN AN ORGANIZED HEALTH CARE EDUCATION/TRAINING PROGRAM

## 2024-10-10 ENCOUNTER — HOSPITAL ENCOUNTER (EMERGENCY)
Facility: MEDICAL CENTER | Age: 44
End: 2024-10-10
Attending: STUDENT IN AN ORGANIZED HEALTH CARE EDUCATION/TRAINING PROGRAM
Payer: COMMERCIAL

## 2024-10-10 ENCOUNTER — PHARMACY VISIT (OUTPATIENT)
Dept: PHARMACY | Facility: MEDICAL CENTER | Age: 44
End: 2024-10-10
Payer: COMMERCIAL

## 2024-10-10 VITALS
TEMPERATURE: 97.9 F | BODY MASS INDEX: 20.01 KG/M2 | HEART RATE: 92 BPM | DIASTOLIC BLOOD PRESSURE: 79 MMHG | HEIGHT: 68 IN | WEIGHT: 132.06 LBS | RESPIRATION RATE: 20 BRPM | OXYGEN SATURATION: 99 % | SYSTOLIC BLOOD PRESSURE: 120 MMHG

## 2024-10-10 DIAGNOSIS — K04.7 DENTAL INFECTION: ICD-10-CM

## 2024-10-10 DIAGNOSIS — R22.0 FACIAL SWELLING: ICD-10-CM

## 2024-10-10 PROCEDURE — RXMED WILLOW AMBULATORY MEDICATION CHARGE: Performed by: STUDENT IN AN ORGANIZED HEALTH CARE EDUCATION/TRAINING PROGRAM

## 2024-10-10 PROCEDURE — 700102 HCHG RX REV CODE 250 W/ 637 OVERRIDE(OP): Mod: UD | Performed by: STUDENT IN AN ORGANIZED HEALTH CARE EDUCATION/TRAINING PROGRAM

## 2024-10-10 PROCEDURE — 99283 EMERGENCY DEPT VISIT LOW MDM: CPT

## 2024-10-10 PROCEDURE — A9270 NON-COVERED ITEM OR SERVICE: HCPCS | Mod: UD | Performed by: STUDENT IN AN ORGANIZED HEALTH CARE EDUCATION/TRAINING PROGRAM

## 2024-10-10 RX ORDER — DEXAMETHASONE 4 MG/1
16 TABLET ORAL ONCE
Status: COMPLETED | OUTPATIENT
Start: 2024-10-10 | End: 2024-10-10

## 2024-10-10 RX ORDER — FAMOTIDINE 20 MG/1
20 TABLET, FILM COATED ORAL 2 TIMES DAILY
Qty: 60 TABLET | Refills: 0 | Status: SHIPPED | OUTPATIENT
Start: 2024-10-10

## 2024-10-10 RX ORDER — FAMOTIDINE 20 MG/1
20 TABLET, FILM COATED ORAL ONCE
Status: COMPLETED | OUTPATIENT
Start: 2024-10-10 | End: 2024-10-10

## 2024-10-10 RX ORDER — PREDNISONE 20 MG/1
60 TABLET ORAL DAILY
Qty: 12 TABLET | Refills: 0 | Status: SHIPPED | OUTPATIENT
Start: 2024-10-10 | End: 2024-10-14

## 2024-10-10 RX ORDER — DOXYCYCLINE 100 MG/1
100 CAPSULE ORAL 2 TIMES DAILY
Qty: 14 CAPSULE | Refills: 0 | Status: ACTIVE | OUTPATIENT
Start: 2024-10-10 | End: 2024-10-17

## 2024-10-10 RX ORDER — DIPHENHYDRAMINE HCL 25 MG
25 TABLET ORAL ONCE
Status: COMPLETED | OUTPATIENT
Start: 2024-10-10 | End: 2024-10-10

## 2024-10-10 RX ADMIN — FAMOTIDINE 20 MG: 20 TABLET, FILM COATED ORAL at 04:29

## 2024-10-10 RX ADMIN — DEXAMETHASONE 16 MG: 4 TABLET ORAL at 04:29

## 2024-10-10 RX ADMIN — DIPHENHYDRAMINE HYDROCHLORIDE 25 MG: 25 TABLET ORAL at 04:29

## 2024-11-02 ENCOUNTER — HOSPITAL ENCOUNTER (EMERGENCY)
Facility: MEDICAL CENTER | Age: 44
End: 2024-11-02
Attending: EMERGENCY MEDICINE
Payer: COMMERCIAL

## 2024-11-02 ENCOUNTER — PHARMACY VISIT (OUTPATIENT)
Dept: PHARMACY | Facility: MEDICAL CENTER | Age: 44
End: 2024-11-02
Payer: COMMERCIAL

## 2024-11-02 VITALS
HEART RATE: 91 BPM | RESPIRATION RATE: 16 BRPM | HEIGHT: 68 IN | SYSTOLIC BLOOD PRESSURE: 136 MMHG | BODY MASS INDEX: 20.52 KG/M2 | WEIGHT: 135.36 LBS | DIASTOLIC BLOOD PRESSURE: 92 MMHG | OXYGEN SATURATION: 98 % | TEMPERATURE: 96.5 F

## 2024-11-02 DIAGNOSIS — Z76.0 MEDICATION REFILL: ICD-10-CM

## 2024-11-02 DIAGNOSIS — K04.7 DENTAL INFECTION: ICD-10-CM

## 2024-11-02 DIAGNOSIS — R06.02 SOB (SHORTNESS OF BREATH): ICD-10-CM

## 2024-11-02 PROCEDURE — 700102 HCHG RX REV CODE 250 W/ 637 OVERRIDE(OP): Mod: UD | Performed by: EMERGENCY MEDICINE

## 2024-11-02 PROCEDURE — RXMED WILLOW AMBULATORY MEDICATION CHARGE: Performed by: EMERGENCY MEDICINE

## 2024-11-02 PROCEDURE — 99283 EMERGENCY DEPT VISIT LOW MDM: CPT

## 2024-11-02 PROCEDURE — A9270 NON-COVERED ITEM OR SERVICE: HCPCS | Mod: UD | Performed by: EMERGENCY MEDICINE

## 2024-11-02 RX ORDER — CLINDAMYCIN HYDROCHLORIDE 150 MG/1
450 CAPSULE ORAL 3 TIMES DAILY
Qty: 63 CAPSULE | Refills: 0 | Status: ACTIVE | OUTPATIENT
Start: 2024-11-02 | End: 2024-11-09

## 2024-11-02 RX ORDER — ALBUTEROL SULFATE 90 UG/1
2 INHALANT RESPIRATORY (INHALATION) EVERY 6 HOURS PRN
Qty: 8.5 G | Refills: 0 | Status: SHIPPED | OUTPATIENT
Start: 2024-11-02 | End: 2024-11-12

## 2024-11-02 RX ORDER — ALBUTEROL SULFATE 90 UG/1
2 INHALANT RESPIRATORY (INHALATION) ONCE
Status: COMPLETED | OUTPATIENT
Start: 2024-11-02 | End: 2024-11-02

## 2024-11-02 RX ORDER — CLINDAMYCIN HYDROCHLORIDE 150 MG/1
450 CAPSULE ORAL ONCE
Status: COMPLETED | OUTPATIENT
Start: 2024-11-02 | End: 2024-11-02

## 2024-11-02 RX ADMIN — CLINDAMYCIN HYDROCHLORIDE 450 MG: 150 CAPSULE ORAL at 06:50

## 2024-11-02 RX ADMIN — ALBUTEROL SULFATE 2 PUFF: 90 AEROSOL, METERED RESPIRATORY (INHALATION) at 06:52

## 2024-11-02 NOTE — ED PROVIDER NOTES
"ER Provider Note    Scribed for Dr. Wilmer Valero M.D. by Evita Rdz. 11/2/2024  6:28 AM    Primary Care Provider: Pcp Pt States None    CHIEF COMPLAINT  Chief Complaint   Patient presents with    Medication Refill     Pt has a Hx of asthma and does not have his inhaler. He c/o SOB.      EXTERNAL RECORDS REVIEWED  Outpatient Notes Patient has a history of viral syndrome and was seen for a refill at [place] on 10/25/2024.    HPI/MARISOL    Jamil Orozco Jr. is a 44 y.o. male who presents to the ED with a history of asthma and needs a refill for his inhaler. He notes chronic shortness of breath and has no new or worsening symptoms today. He also reports a tooth infection in his right upper canine and is requesting antibiotics. Patient has a drug allergy to Penicillin and Unasyn.     PAST MEDICAL HISTORY  Past Medical History:   Diagnosis Date    Schizophrenia (HCC)      SURGICAL HISTORY  History reviewed. No pertinent surgical history.    FAMILY HISTORY  History reviewed. No pertinent family history.    SOCIAL HISTORY   reports that he has been smoking cigarettes. He has never used smokeless tobacco. He reports current drug use. Drug: Inhaled. He reports that he does not drink alcohol.    CURRENT MEDICATIONS  Previous Medications    FAMOTIDINE (PEPCID) 20 MG TAB    Take 1 Tablet by mouth 2 times a day.    HYDROXYZINE HCL (ATARAX) 25 MG TAB    Take 1 Tab by mouth 3 times a day as needed for Anxiety.    METHYLPREDNISOLONE (MEDROL DOSEPAK) 4 MG TABLET THERAPY PACK    Use as directed     ALLERGIES  Patient has no known allergies.    PHYSICAL EXAM  BP (!) 143/97   Pulse 90   Temp 35.8 °C (96.5 °F) (Temporal)   Resp 15   Ht 1.727 m (5' 8\")   Wt 61.4 kg (135 lb 5.8 oz)   SpO2 96%   BMI 20.58 kg/m²   Constitutional: Alert in no apparent distress.  HENT: No signs of trauma, Bilateral external ears normal, Nose normal. Poor dentition, Dental caries in right upper canine.  Eyes: Pupils are equal and reactive, " Conjunctiva normal, Non-icteric.   Neck: Normal range of motion, No tenderness, Supple,   Cardiovascular: Regular rate and rhythm, no murmurs.   Thorax & Lungs: Normal breath sounds, No respiratory distress, No wheezing, No chest tenderness  Abdomen: Bowel sounds normal, Soft, No tenderness,   Skin: Warm, Dry, No erythema, No rash.   Back: No bony tenderness, No CVA tenderness.   Extremities: No edema, No tenderness, No cyanosis, no tenderness  Psychiatric: Affect normal     COURSE & MEDICAL DECISION MAKING    ED Observation Status? No; Patient does not meet criteria for ED Observation.     INITIAL ASSESSMENT AND PLAN  Care Narrative:       6:40 AM - Patient was seen and evaluated at bedside. Patient presents to the ED for medication refill.  After my exam, I discussed with the patient the plan of care, which includes treating the patient with medication for their symptoms. Patient will be treated with albuterol inhaler 2 Puff, Cleocin 450 mg. I discussed plan for discharge and follow up as outlined below. The patient is stable for discharge at this time and will return for any new or worsening symptoms. Patient verbalizes understanding and support with my plan for discharge.      ADDITIONAL PROBLEM LIST AND DISPOSITION  Asking for refill of inhaler.  Chronic shortness of breath.  Also mentions that he is having dental infections.  He does have dental caries noted.  Will give antibiotics for this.  Will give him an albuterol inhaler as well.  No obvious drainable abscess in the patient's mouth.  No hypoxia.  Will discharge home with strict return precautions and follow-up.               DISPOSITION AND DISCUSSIONS    Escalation of care considered, and ultimately not performed: diagnostic imaging.    Barriers to care at this time, including but not limited to: Patient does not have established PCP.     Decision tools and prescription drugs considered including, but not limited to:  albuterol and cleocin .   The  patient will return for new or worsening symptoms and is stable at the time of discharge.    DISPOSITION:  Patient will be discharged home in stable condition.    OUTPATIENT MEDICATIONS:  New Prescriptions    ALBUTEROL 108 (90 BASE) MCG/ACT AERO SOLN INHALATION AEROSOL    Inhale 2 Puffs by mouth every 6 hours as needed for Shortness of Breath.    CLINDAMYCIN (CLEOCIN) 150 MG CAP    Take 3 Capsules by mouth 3 times a day for 7 days.      FINAL IMPRESSION   1. Medication refill    2. Dental infection Acute   3. SOB (shortness of breath) Chronic        Evita NICHOLSON (Scribe), am scribing for, and in the presence of, Wilmer Valero M.D..    Electronically signed by: Evita Rdz (Fe), 11/2/2024    Wilmer NICHOLSON M.D. personally performed the services described in this documentation, as scribed by Evita Rdz in my presence, and it is both accurate and complete.    The note accurately reflects work and decisions made by me.  Wilmer Valero M.D.  11/2/2024  10:59 AM

## 2024-11-02 NOTE — ED TRIAGE NOTES
Chief Complaint   Patient presents with    Medication Refill     Pt has a Hx of asthma and does not have his inhaler. He c/o SOB.      Pt ambulatory to triage for above complaint. A&Ox4, GCS 15, speaking in full sentences. Respirations equal and unlabored. NAD.  Appropriate protocols ordered.   Pt educated on triage process and instructed to notify staff of worsening condition.   Pt placed in the lobby in stable condition.

## 2024-11-02 NOTE — ED NOTES
"Jamil Orozco Jr. has been discharged from the Emergency Room.        Discharge instructions, which include signs and symptoms to monitor patient for, as well as detailed information regarding Dental Infection provided.  Patient verbalizes understanding of follow up care and medication management. All questions and concerns addressed at this time.     Patient provided with education on when to return to the ER and verbally understands with no concerns. Patient advised on setting up MyChart and information provided about patient survey.  Patient leaves ER in no apparent distress. This RN provided education regarding returning to the ER for any new concerns or changes in patient's condition.      BP (!) 136/92   Pulse 91   Temp 35.8 °C (96.5 °F) (Temporal)   Resp 16   Ht 1.727 m (5' 8\")   Wt 61.4 kg (135 lb 5.8 oz)   SpO2 98%   BMI 20.58 kg/m²   "

## 2024-11-12 ENCOUNTER — PHARMACY VISIT (OUTPATIENT)
Dept: PHARMACY | Facility: MEDICAL CENTER | Age: 44
End: 2024-11-12
Payer: COMMERCIAL

## 2024-11-12 ENCOUNTER — HOSPITAL ENCOUNTER (EMERGENCY)
Facility: MEDICAL CENTER | Age: 44
End: 2024-11-12
Attending: EMERGENCY MEDICINE
Payer: COMMERCIAL

## 2024-11-12 VITALS
SYSTOLIC BLOOD PRESSURE: 121 MMHG | HEIGHT: 68 IN | HEART RATE: 114 BPM | BODY MASS INDEX: 19.71 KG/M2 | TEMPERATURE: 99.5 F | DIASTOLIC BLOOD PRESSURE: 78 MMHG | RESPIRATION RATE: 20 BRPM | OXYGEN SATURATION: 95 % | WEIGHT: 130.07 LBS

## 2024-11-12 DIAGNOSIS — R22.0 LIP SWELLING: ICD-10-CM

## 2024-11-12 PROCEDURE — 700102 HCHG RX REV CODE 250 W/ 637 OVERRIDE(OP): Mod: UD | Performed by: EMERGENCY MEDICINE

## 2024-11-12 PROCEDURE — 700111 HCHG RX REV CODE 636 W/ 250 OVERRIDE (IP): Mod: UD | Performed by: EMERGENCY MEDICINE

## 2024-11-12 PROCEDURE — 99283 EMERGENCY DEPT VISIT LOW MDM: CPT

## 2024-11-12 PROCEDURE — A9270 NON-COVERED ITEM OR SERVICE: HCPCS | Mod: UD | Performed by: EMERGENCY MEDICINE

## 2024-11-12 PROCEDURE — RXMED WILLOW AMBULATORY MEDICATION CHARGE: Performed by: EMERGENCY MEDICINE

## 2024-11-12 RX ORDER — FAMOTIDINE 20 MG/1
20 TABLET, FILM COATED ORAL 2 TIMES DAILY
Qty: 10 TABLET | Refills: 0 | Status: SHIPPED | OUTPATIENT
Start: 2024-11-12 | End: 2024-11-17

## 2024-11-12 RX ORDER — PREDNISONE 20 MG/1
60 TABLET ORAL ONCE
Status: COMPLETED | OUTPATIENT
Start: 2024-11-12 | End: 2024-11-12

## 2024-11-12 RX ORDER — DIPHENHYDRAMINE HCL 25 MG
25 TABLET ORAL ONCE
Status: COMPLETED | OUTPATIENT
Start: 2024-11-12 | End: 2024-11-12

## 2024-11-12 RX ORDER — DIPHENHYDRAMINE HCL 25 MG
25 CAPSULE ORAL EVERY 6 HOURS PRN
Qty: 20 CAPSULE | Refills: 0 | Status: SHIPPED | OUTPATIENT
Start: 2024-11-12 | End: 2024-11-17

## 2024-11-12 RX ORDER — PREDNISONE 20 MG/1
60 TABLET ORAL DAILY
Qty: 12 TABLET | Refills: 0 | Status: SHIPPED | OUTPATIENT
Start: 2024-11-12 | End: 2024-11-16

## 2024-11-12 RX ADMIN — DIPHENHYDRAMINE HYDROCHLORIDE 25 MG: 25 TABLET ORAL at 17:25

## 2024-11-12 RX ADMIN — PREDNISONE 60 MG: 20 TABLET ORAL at 17:25

## 2024-11-12 NOTE — ED TRIAGE NOTES
Patient to ED with complaints of lower lip swelling this this am. He reports it does not hurt. No sob or wheezing.   He does have a rapid speech pattern restlessness in triage.     Pt educated on ED process and asked to wait in lobby. Patient educated on importance of alerting staff to new or worsening symptoms or concerns.

## 2024-11-12 NOTE — ED NOTES
Pt's name called in lobby for 3rd time with no response, pt not seen by check in staff. Tech reports pt was seen leaving ED. Pt dismissed from system.

## 2024-11-13 NOTE — DISCHARGE INSTRUCTIONS
Return for difficulty swallowing, difficulty breathing or any concerns.  Please obtain primary care doctor

## 2024-11-13 NOTE — ED NOTES
Pt medicated per MAR    Pt educated on discharge instructions. All questions & concerns addressed. Vitals re-assessed and at pt's baseline. Pt ambulates to exit with steady gait and all belongings.    Pt provided food per ERP    No IV to d/c

## 2024-11-13 NOTE — ED PROVIDER NOTES
"ED Provider Note    CHIEF COMPLAINT  Chief Complaint   Patient presents with    Lip Swelling       EXTERNAL RECORDS REVIEWED  Past notes reveal several both inpatient and outpatient visits for similar lip swelling, history of allergic reactions of unknown etiology    HPI/ROS  LIMITATION TO HISTORY   Select: : None  OUTSIDE HISTORIAN(S):  None    Jamil Orozco Jr. is a 44 y.o. male who presents left lower lip swelling, onset yesterday.  He states similar events have occurred in the past.  He denies injury to the area.  No difficulty swallowing or breathing.    PAST MEDICAL HISTORY   has a past medical history of Asthma and Schizophrenia ([facility]).    SURGICAL HISTORY  patient denies any surgical history    FAMILY HISTORY  No family history on file.    SOCIAL HISTORY  Social History     Tobacco Use    Smoking status: Every Day     Current packs/day: 0.50     Types: Cigarettes    Smokeless tobacco: Never   Vaping Use    Vaping status: Former   Substance and Sexual Activity    Alcohol use: No    Drug use: Yes     Types: Inhaled     Comment: THC    Sexual activity: Not on file       CURRENT MEDICATIONS  Home Medications    **Home medications have not yet been reviewed for this encounter**         ALLERGIES  Allergies   Allergen Reactions    Penicillins        PHYSICAL EXAM  VITAL SIGNS: /88   Pulse (!) 103   Temp 37.3 °C (99.1 °F) (Temporal)   Resp 19   Ht 1.727 m (5' 8\")   Wt 59 kg (130 lb 1.1 oz)   SpO2 98%   BMI 19.78 kg/m²    ENT: Left lower lip swollen nodule, mildly tender.  No fluctuance, no surrounding cellulitic change.  Within the mouth, no lesions, posterior pharynx normal.    Neurologic: Speech clear.  Strength intact.  Balance normal.  Eyes: No nystagmus, pupils are equal  Skin: Lip lesion as above  GI: No tenderness  Respiratory: Clear lung sounds, no stridor      COURSE & MEDICAL DECISION MAKING    ASSESSMENT, COURSE AND PLAN  Care Narrative: Patient presents with lesion left lower lip, swollen " for 1 day.  He states has had similar areas of swelling in the past, previously treated with steroids and antihistamines.  He is again given this medication.  No evidence of difficulty swallowing, no difficulty breathing.  No other evidence of systemic toxicity.  His lung sounds are clear without wheezing or stridor.  Patient is prescribed prednisone, Pepcid, Benadryl and is discharged in well-appearing condition      DISPOSITION AND DISCUSSIONS    Escalation of care considered, and ultimately not performed:Laboratory analysis    Barriers to care at this time, including but not limited to: Patient is homeless.     Decision tools and prescription drugs considered including, but not limited to: Antibiotics not indicated, no evidence of bacterial infection .    FINAL DIAGNOSIS  1. Lip swelling         Electronically signed by: Delvis Garcia M.D., 11/12/2024 5:20 PM

## 2024-11-19 ENCOUNTER — HOSPITAL ENCOUNTER (EMERGENCY)
Facility: MEDICAL CENTER | Age: 44
End: 2024-11-19
Attending: STUDENT IN AN ORGANIZED HEALTH CARE EDUCATION/TRAINING PROGRAM
Payer: COMMERCIAL

## 2024-11-19 VITALS
RESPIRATION RATE: 18 BRPM | OXYGEN SATURATION: 96 % | SYSTOLIC BLOOD PRESSURE: 149 MMHG | DIASTOLIC BLOOD PRESSURE: 82 MMHG | TEMPERATURE: 98 F | HEART RATE: 95 BPM | HEIGHT: 68 IN | BODY MASS INDEX: 20.38 KG/M2 | WEIGHT: 134.48 LBS

## 2024-11-19 DIAGNOSIS — J34.0 NOSE CELLULITIS: ICD-10-CM

## 2024-11-19 PROCEDURE — 99282 EMERGENCY DEPT VISIT SF MDM: CPT

## 2024-11-19 RX ORDER — SULFAMETHOXAZOLE AND TRIMETHOPRIM 800; 160 MG/1; MG/1
1 TABLET ORAL 2 TIMES DAILY
Qty: 14 TABLET | Refills: 0 | Status: ACTIVE | OUTPATIENT
Start: 2024-11-19 | End: 2024-11-21

## 2024-11-19 RX ORDER — MUPIROCIN 20 MG/G
1 OINTMENT TOPICAL 2 TIMES DAILY
Qty: 22 G | Refills: 0 | Status: SHIPPED | OUTPATIENT
Start: 2024-11-19 | End: 2024-11-21

## 2024-11-19 RX ORDER — CEPHALEXIN 500 MG/1
500 CAPSULE ORAL 4 TIMES DAILY
Qty: 28 CAPSULE | Refills: 0 | Status: ACTIVE | OUTPATIENT
Start: 2024-11-19 | End: 2024-11-21

## 2024-11-20 PROCEDURE — 99283 EMERGENCY DEPT VISIT LOW MDM: CPT

## 2024-11-20 NOTE — ED PROVIDER NOTES
"ED Provider Note    CHIEF COMPLAINT  Chief Complaint   Patient presents with    Skin Lesion       EXTERNAL RECORDS REVIEWED  External ED Note ER visit on 11/17 at [facility] for nose cellulitis for the last 2 weeks.        HPI/ROS  LIMITATION TO HISTORY   Select: : None  OUTSIDE HISTORIAN(S):    Jamil Orozco Jr. is a 44 y.o. male who presents with concern for nose cellulitis.  Patient reports that he has not taken the antibiotics he was prescribed 2 days ago patient denies difficulty breathing or swallowing.  Patient denies fevers chills body aches or sweats.  Patient does endorse recent methamphetamine use.    PAST MEDICAL HISTORY   has a past medical history of Asthma and Schizophrenia (HCC).    SURGICAL HISTORY  patient denies any surgical history    FAMILY HISTORY  History reviewed. No pertinent family history.    SOCIAL HISTORY  Social History     Tobacco Use    Smoking status: Former     Current packs/day: 0.50     Types: Cigarettes    Smokeless tobacco: Never   Vaping Use    Vaping status: Former   Substance and Sexual Activity    Alcohol use: No    Drug use: Yes     Types: Inhaled     Comment: THC    Sexual activity: Not on file       CURRENT MEDICATIONS  Home Medications       Reviewed by Sweetie Mari R.N. (Registered Nurse) on 11/19/24 at 1709  Med List Status: Not Addressed     Medication Last Dose Status        Patient Clement Taking any Medications                           ALLERGIES  Allergies   Allergen Reactions    Penicillins        PHYSICAL EXAM  VITAL SIGNS: BP (!) 154/96   Pulse (!) 115   Temp 36.7 °C (98 °F) (Temporal)   Resp 20   Ht 1.727 m (5' 8\")   Wt 61 kg (134 lb 7.7 oz)   SpO2 97%   BMI 20.45 kg/m²    Vitals and nursing note reviewed.   Constitutional:       Comments: Patient is lying in bed supine, pleasant, conversant, speaking in complete sentences   HENT:   Mild erythema to the tip of the nose with tenderness, no fluctuance, no crepitus, no mass inside the nares or nasal " cavity  Eyes:      Extraocular Movements: Extraocular movements intact.      Conjunctiva/sclera: Conjunctivae normal.      Pupils: Pupils are equal, round, and reactive to light.   Cardiovascular:      Pulses: Normal pulses.      Comments:   Pulmonary:      Effort: Pulmonary effort is normal. No respiratory distress.   Musculoskeletal:         General: No swelling. Normal range of motion.      Cervical back: Normal range of motion. No rigidity.   Skin:     General: Skin is warm and dry.      Capillary Refill: Capillary refill takes less than 2 seconds.   Neurological:      Mental Status: Alert.  Psychomotor agitation.        COURSE & MEDICAL DECISION MAKING    ASSESSMENT, COURSE AND PLAN  Care Narrative: I believe the patient's tachycardia is likely secondary to sympathomimetic drug use.  Patient is not febrile, not hypotensive, I doubt that sepsis is ongoing at this time precipitating the patient's current presentation.  Patient has been encouraged to take his antibiotics as prescribed.  Antibiotics that were prescribed 2 days ago have been represcribed by myself.  No fluctuant masses to note the need for incision and drainage.  No evidence of a nasal mass at this time.  No airway issues.  Patient given follow-up information for ENT and primary care.    Repeat physical exam benign.  I doubt any serious emergency process at this time.  Patient and/or family, friends given strict return precautions for worsening symptoms and care instructions. They have demonstrated understanding of discharge instructions through teach back mechanism. Advised PCP follow-up in 1-2 days.  Patient/family/friend expresses understanding and agrees to plan.    This dictation has been created using voice recognition software. I am continuously working with the software to minimize the number of voice recognition errors and I have made every attempt to manually correct the errors within my dictation. However errors  related to this  voice recognition software may still exist and should be interpreted within the appropriate context.     Electronically signed by: Juan Manjarrez M.D., 11/19/2024 7:16 PM    DISPOSITION AND DISCUSSIONS  Escalation of care considered, and ultimately not performed:diagnostic imaging    Barriers to care at this time, including but not limited to: Patient does not have established PCP, Patient is homeless, and Patient lacks transportation .     Decision tools and prescription drugs considered including, but not limited to: Pain Medications   over-the-counter pain medications are appropriate, narcotics not indicated at this time  .    FINAL DIAGNOSIS  1. Nose cellulitis         Electronically signed by: Juan Manjarrez M.D., 11/19/2024 7:13 PM

## 2024-11-20 NOTE — ED TRIAGE NOTES
Jamil Orozco .  44 y.o. male    Chief Complaint   Patient presents with    Skin Lesion     Pt arrives with complaints of nose cellulitis. Was seen at [facility] on 11/17 and prescribed two antibiotics and mupirocin ointment but pt has not picked it up. Pt states he wanted to stop by the hospital to see if there was an IV form he could have.    Pt speaking in tangential speech very rapidly. Unable to understand majority of speech. Pt repeatedly saying aspirin is the basis of cocaine. Denies drug use         Vitals:    11/19/24 1703   BP: (!) 154/96   Pulse: (!) 115   Resp: 20   Temp: 36.7 °C (98 °F)   SpO2: 97%       Triage process explained to patient, apologized for wait time, and returned to lobby.  Pt informed to notify staff of any change in condition.

## 2024-11-20 NOTE — ED NOTES
"Attempted to organize \"meds to beds\" for patient however because pt already has prescriptions ready at [facility] insurance at [facility] cover medication. Pt states he will  abx from [facility] in AM. AVS discussed with patient. F/u instructions discussed. Ambulatory with steady gait to lobby  "

## 2024-11-21 ENCOUNTER — HOSPITAL ENCOUNTER (EMERGENCY)
Facility: MEDICAL CENTER | Age: 44
End: 2024-11-21
Attending: STUDENT IN AN ORGANIZED HEALTH CARE EDUCATION/TRAINING PROGRAM
Payer: COMMERCIAL

## 2024-11-21 VITALS
SYSTOLIC BLOOD PRESSURE: 146 MMHG | BODY MASS INDEX: 19.88 KG/M2 | HEIGHT: 68 IN | HEART RATE: 121 BPM | TEMPERATURE: 99.7 F | RESPIRATION RATE: 16 BRPM | OXYGEN SATURATION: 96 % | DIASTOLIC BLOOD PRESSURE: 103 MMHG | WEIGHT: 131.17 LBS

## 2024-11-21 DIAGNOSIS — J34.0 CELLULITIS OF NASAL TIP: Primary | ICD-10-CM

## 2024-11-21 DIAGNOSIS — J34.0 NOSE CELLULITIS: ICD-10-CM

## 2024-11-21 PROCEDURE — A9270 NON-COVERED ITEM OR SERVICE: HCPCS | Mod: UD | Performed by: STUDENT IN AN ORGANIZED HEALTH CARE EDUCATION/TRAINING PROGRAM

## 2024-11-21 PROCEDURE — 700102 HCHG RX REV CODE 250 W/ 637 OVERRIDE(OP): Mod: UD | Performed by: STUDENT IN AN ORGANIZED HEALTH CARE EDUCATION/TRAINING PROGRAM

## 2024-11-21 RX ORDER — SULFAMETHOXAZOLE AND TRIMETHOPRIM 800; 160 MG/1; MG/1
1 TABLET ORAL ONCE
Status: COMPLETED | OUTPATIENT
Start: 2024-11-21 | End: 2024-11-21

## 2024-11-21 RX ORDER — SULFAMETHOXAZOLE AND TRIMETHOPRIM 800; 160 MG/1; MG/1
1 TABLET ORAL 2 TIMES DAILY
Qty: 14 TABLET | Refills: 0 | Status: ACTIVE | OUTPATIENT
Start: 2024-11-21

## 2024-11-21 RX ORDER — MUPIROCIN 20 MG/G
1 OINTMENT TOPICAL 2 TIMES DAILY
Qty: 22 G | Refills: 0 | Status: SHIPPED | OUTPATIENT
Start: 2024-11-21

## 2024-11-21 RX ORDER — LORAZEPAM 1 MG/1
1 TABLET ORAL ONCE
Status: COMPLETED | OUTPATIENT
Start: 2024-11-21 | End: 2024-11-21

## 2024-11-21 RX ORDER — CEPHALEXIN 500 MG/1
500 CAPSULE ORAL ONCE
Status: COMPLETED | OUTPATIENT
Start: 2024-11-21 | End: 2024-11-21

## 2024-11-21 RX ORDER — CEPHALEXIN 500 MG/1
500 CAPSULE ORAL 4 TIMES DAILY
Qty: 28 CAPSULE | Refills: 0 | Status: ACTIVE | OUTPATIENT
Start: 2024-11-21

## 2024-11-21 RX ADMIN — LORAZEPAM 1 MG: 1 TABLET ORAL at 00:54

## 2024-11-21 RX ADMIN — SULFAMETHOXAZOLE AND TRIMETHOPRIM 1 TABLET: 800; 160 TABLET ORAL at 00:54

## 2024-11-21 RX ADMIN — CEPHALEXIN 500 MG: 500 CAPSULE ORAL at 00:54

## 2024-11-21 NOTE — ED TRIAGE NOTES
"Chief Complaint   Patient presents with    Psych Eval     Patient have flight of ideas in triage talking about diseases, antibiotics and wants IV to recover weight,     Ambulatory to triage, denies any drug intakes tonight.     BP (!) 176/102   Pulse (!) 114   Temp 36.8 °C (98.3 °F) (Oral)   Resp 16   Ht 1.727 m (5' 8\")   Wt 59.5 kg (131 lb 2.8 oz)   SpO2 95%   BMI 19.94 kg/m²     "
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
## 2024-11-21 NOTE — ED NOTES
Pt ambulated to the room with steady gait and without assistance. Pt placed on SpO2 and BP monitors. Call light within reach. No further complaints at this time. Chart up for ERP.

## 2024-12-18 ENCOUNTER — HOSPITAL ENCOUNTER (EMERGENCY)
Facility: MEDICAL CENTER | Age: 44
End: 2024-12-18
Attending: EMERGENCY MEDICINE
Payer: COMMERCIAL

## 2024-12-18 ENCOUNTER — PHARMACY VISIT (OUTPATIENT)
Dept: PHARMACY | Facility: MEDICAL CENTER | Age: 44
End: 2024-12-18
Payer: COMMERCIAL

## 2024-12-18 VITALS
RESPIRATION RATE: 18 BRPM | DIASTOLIC BLOOD PRESSURE: 95 MMHG | BODY MASS INDEX: 21.25 KG/M2 | HEIGHT: 68 IN | OXYGEN SATURATION: 97 % | HEART RATE: 97 BPM | SYSTOLIC BLOOD PRESSURE: 137 MMHG | TEMPERATURE: 97.9 F | WEIGHT: 140.21 LBS

## 2024-12-18 DIAGNOSIS — Z76.0 MEDICATION REFILL: ICD-10-CM

## 2024-12-18 DIAGNOSIS — M79.671 RIGHT FOOT PAIN: ICD-10-CM

## 2024-12-18 DIAGNOSIS — R21 RASH: ICD-10-CM

## 2024-12-18 PROCEDURE — 99283 EMERGENCY DEPT VISIT LOW MDM: CPT

## 2024-12-18 PROCEDURE — 700102 HCHG RX REV CODE 250 W/ 637 OVERRIDE(OP): Mod: UD | Performed by: EMERGENCY MEDICINE

## 2024-12-18 PROCEDURE — A9270 NON-COVERED ITEM OR SERVICE: HCPCS | Mod: UD | Performed by: EMERGENCY MEDICINE

## 2024-12-18 PROCEDURE — RXMED WILLOW AMBULATORY MEDICATION CHARGE: Performed by: STUDENT IN AN ORGANIZED HEALTH CARE EDUCATION/TRAINING PROGRAM

## 2024-12-18 PROCEDURE — RXMED WILLOW AMBULATORY MEDICATION CHARGE: Performed by: EMERGENCY MEDICINE

## 2024-12-18 RX ORDER — IBUPROFEN 600 MG/1
600 TABLET, FILM COATED ORAL EVERY 6 HOURS PRN
Qty: 120 TABLET | Refills: 0 | Status: SHIPPED | OUTPATIENT
Start: 2024-12-18 | End: 2024-12-20

## 2024-12-18 RX ORDER — LEVOTHYROXINE SODIUM 100 UG/1
100 TABLET ORAL
Qty: 30 TABLET | Refills: 0 | Status: SHIPPED | OUTPATIENT
Start: 2024-12-18

## 2024-12-18 RX ORDER — ALBUTEROL SULFATE 90 UG/1
2 INHALANT RESPIRATORY (INHALATION) EVERY 6 HOURS PRN
Qty: 8.5 G | Refills: 0 | Status: SHIPPED | OUTPATIENT
Start: 2024-12-18 | End: 2024-12-20

## 2024-12-18 RX ORDER — ACETAMINOPHEN 500 MG
500-1000 TABLET ORAL EVERY 8 HOURS PRN
Qty: 180 TABLET | Refills: 0 | Status: SHIPPED | OUTPATIENT
Start: 2024-12-18 | End: 2024-12-20

## 2024-12-18 RX ORDER — RISPERIDONE 1 MG/1
TABLET ORAL
Qty: 90 TABLET | Refills: 0 | Status: SHIPPED | OUTPATIENT
Start: 2024-12-18 | End: 2024-12-20

## 2024-12-18 RX ORDER — RISPERIDONE 2 MG/1
2 TABLET ORAL ONCE
Status: COMPLETED | OUTPATIENT
Start: 2024-12-18 | End: 2024-12-18

## 2024-12-18 RX ORDER — DIPHENHYDRAMINE HCL 25 MG
25 CAPSULE ORAL EVERY 6 HOURS PRN
Qty: 30 CAPSULE | Refills: 0 | Status: SHIPPED | OUTPATIENT
Start: 2024-12-18 | End: 2024-12-20

## 2024-12-18 RX ORDER — DIPHENHYDRAMINE HCL 25 MG
25 TABLET ORAL ONCE
Status: COMPLETED | OUTPATIENT
Start: 2024-12-18 | End: 2024-12-18

## 2024-12-18 RX ADMIN — RISPERIDONE 2 MG: 2 TABLET, FILM COATED ORAL at 02:25

## 2024-12-18 RX ADMIN — DIPHENHYDRAMINE HYDROCHLORIDE 25 MG: 25 TABLET ORAL at 02:25

## 2024-12-18 ASSESSMENT — PAIN DESCRIPTION - PAIN TYPE: TYPE: ACUTE PAIN

## 2024-12-18 NOTE — ED NOTES
Pt brought to room GRN 31 from triage.  Patient ambulated under own power without difficulty.  V/S obtained and monitor applied.  ERP to see.
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
## 2024-12-18 NOTE — ED NOTES
Patient discharged home per ERP  Discharge teaching and education discussed with patient.  Patient verbalized understanding of discharge teaching and education. No other questions at this time.    VSS. Patient alert and oriented.  Patient's ride arranged by self.  Able to ambulate off unit safely with steady gait.  All belongings with patient at time of discharge.
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
## 2024-12-18 NOTE — DISCHARGE INSTRUCTIONS
You were seen in the emergency department for a rash and medication refill.  The rash appears to be good at this time but you are being prescribed Benadryl in case it returns.    Your home medications have also been refilled and sent to the pharmacy.  Please pick these up and take them as prescribed.    Return if you develop:  Fever, difficulty breathing, any other new or concerning findings

## 2024-12-19 PROCEDURE — 99283 EMERGENCY DEPT VISIT LOW MDM: CPT

## 2024-12-20 ENCOUNTER — PHARMACY VISIT (OUTPATIENT)
Dept: PHARMACY | Facility: MEDICAL CENTER | Age: 44
End: 2024-12-20
Payer: COMMERCIAL

## 2024-12-20 ENCOUNTER — HOSPITAL ENCOUNTER (EMERGENCY)
Facility: MEDICAL CENTER | Age: 44
End: 2024-12-20
Attending: STUDENT IN AN ORGANIZED HEALTH CARE EDUCATION/TRAINING PROGRAM
Payer: COMMERCIAL

## 2024-12-20 VITALS
WEIGHT: 139.33 LBS | HEIGHT: 68 IN | SYSTOLIC BLOOD PRESSURE: 119 MMHG | BODY MASS INDEX: 21.12 KG/M2 | DIASTOLIC BLOOD PRESSURE: 86 MMHG | RESPIRATION RATE: 20 BRPM | HEART RATE: 87 BPM | TEMPERATURE: 97.6 F | OXYGEN SATURATION: 94 %

## 2024-12-20 DIAGNOSIS — R21 RASH: ICD-10-CM

## 2024-12-20 DIAGNOSIS — Z76.0 MEDICATION REFILL: ICD-10-CM

## 2024-12-20 DIAGNOSIS — M79.671 RIGHT FOOT PAIN: ICD-10-CM

## 2024-12-20 DIAGNOSIS — J34.0 NOSE CELLULITIS: ICD-10-CM

## 2024-12-20 DIAGNOSIS — F20.9 SCHIZOPHRENIA, UNSPECIFIED TYPE (HCC): ICD-10-CM

## 2024-12-20 PROCEDURE — 700102 HCHG RX REV CODE 250 W/ 637 OVERRIDE(OP): Mod: UD | Performed by: STUDENT IN AN ORGANIZED HEALTH CARE EDUCATION/TRAINING PROGRAM

## 2024-12-20 PROCEDURE — A9270 NON-COVERED ITEM OR SERVICE: HCPCS | Mod: UD | Performed by: STUDENT IN AN ORGANIZED HEALTH CARE EDUCATION/TRAINING PROGRAM

## 2024-12-20 PROCEDURE — RXMED WILLOW AMBULATORY MEDICATION CHARGE: Performed by: STUDENT IN AN ORGANIZED HEALTH CARE EDUCATION/TRAINING PROGRAM

## 2024-12-20 RX ORDER — DIPHENHYDRAMINE HCL 25 MG
25 CAPSULE ORAL EVERY 6 HOURS PRN
Qty: 30 CAPSULE | Refills: 0 | Status: SHIPPED | OUTPATIENT
Start: 2024-12-20

## 2024-12-20 RX ORDER — ALBUTEROL SULFATE 90 UG/1
2 INHALANT RESPIRATORY (INHALATION) EVERY 6 HOURS PRN
Qty: 8.5 G | Refills: 0 | Status: SHIPPED | OUTPATIENT
Start: 2024-12-20

## 2024-12-20 RX ORDER — IBUPROFEN 600 MG/1
600 TABLET, FILM COATED ORAL EVERY 6 HOURS PRN
Qty: 120 TABLET | Refills: 0 | Status: SHIPPED | OUTPATIENT
Start: 2024-12-20

## 2024-12-20 RX ORDER — MUPIROCIN 20 MG/G
1 OINTMENT TOPICAL 2 TIMES DAILY
Qty: 22 G | Refills: 0 | Status: SHIPPED | OUTPATIENT
Start: 2024-12-20

## 2024-12-20 RX ORDER — RISPERIDONE 1 MG/1
TABLET ORAL
Qty: 90 TABLET | Refills: 0 | Status: SHIPPED | OUTPATIENT
Start: 2024-12-20

## 2024-12-20 RX ORDER — RISPERIDONE 2 MG/1
2 TABLET ORAL ONCE
Status: COMPLETED | OUTPATIENT
Start: 2024-12-20 | End: 2024-12-20

## 2024-12-20 RX ORDER — DIPHENHYDRAMINE HCL 25 MG
50 TABLET ORAL ONCE
Status: COMPLETED | OUTPATIENT
Start: 2024-12-20 | End: 2024-12-20

## 2024-12-20 RX ORDER — ACETAMINOPHEN 500 MG
500-1000 TABLET ORAL EVERY 8 HOURS PRN
Qty: 180 TABLET | Refills: 0 | Status: SHIPPED | OUTPATIENT
Start: 2024-12-20 | End: 2025-01-19

## 2024-12-20 RX ADMIN — RISPERIDONE 2 MG: 2 TABLET, FILM COATED ORAL at 01:49

## 2024-12-20 RX ADMIN — DIPHENHYDRAMINE HYDROCHLORIDE 50 MG: 25 TABLET ORAL at 01:49
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
## 2024-12-20 NOTE — ED NOTES
Meds to bed provided to pt    Pt educated on discharge instructions. All questions & concerns addressed. Vitals re-assessed and at pt's baseline. Pt ambulates to exit with steady gait and all belongings.    No IV to d/c
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
## 2025-01-27 ENCOUNTER — PHARMACY VISIT (OUTPATIENT)
Dept: PHARMACY | Facility: MEDICAL CENTER | Age: 45
End: 2025-01-27
Payer: COMMERCIAL

## 2025-01-27 ENCOUNTER — HOSPITAL ENCOUNTER (EMERGENCY)
Facility: MEDICAL CENTER | Age: 45
End: 2025-01-27
Attending: STUDENT IN AN ORGANIZED HEALTH CARE EDUCATION/TRAINING PROGRAM
Payer: COMMERCIAL

## 2025-01-27 VITALS
HEART RATE: 102 BPM | BODY MASS INDEX: 20.18 KG/M2 | SYSTOLIC BLOOD PRESSURE: 132 MMHG | RESPIRATION RATE: 16 BRPM | DIASTOLIC BLOOD PRESSURE: 94 MMHG | OXYGEN SATURATION: 95 % | HEIGHT: 68 IN | WEIGHT: 133.16 LBS | TEMPERATURE: 97 F

## 2025-01-27 DIAGNOSIS — K02.9 DENTAL CARIES: ICD-10-CM

## 2025-01-27 PROCEDURE — 99282 EMERGENCY DEPT VISIT SF MDM: CPT

## 2025-01-27 PROCEDURE — RXMED WILLOW AMBULATORY MEDICATION CHARGE: Performed by: STUDENT IN AN ORGANIZED HEALTH CARE EDUCATION/TRAINING PROGRAM

## 2025-01-27 RX ORDER — IBUPROFEN 400 MG/1
400 TABLET, FILM COATED ORAL EVERY 6 HOURS PRN
Qty: 30 TABLET | Refills: 0 | Status: SHIPPED | OUTPATIENT
Start: 2025-01-27

## 2025-01-27 RX ORDER — ALBUTEROL SULFATE 90 UG/1
2 INHALANT RESPIRATORY (INHALATION) EVERY 6 HOURS PRN
Qty: 8.5 G | Refills: 0 | Status: SHIPPED | OUTPATIENT
Start: 2025-01-27

## 2025-01-27 RX ORDER — ACETAMINOPHEN 500 MG
500-1000 TABLET ORAL EVERY 6 HOURS PRN
Qty: 30 TABLET | Refills: 0 | Status: SHIPPED | OUTPATIENT
Start: 2025-01-27

## 2025-01-27 RX ORDER — DOXYCYCLINE 100 MG/1
100 CAPSULE ORAL 2 TIMES DAILY
Qty: 10 CAPSULE | Refills: 0 | Status: ACTIVE | OUTPATIENT
Start: 2025-01-27 | End: 2025-02-01

## 2025-01-28 NOTE — ED TRIAGE NOTES
Jamil Orozco .  44 y.o. male  Chief Complaint   Patient presents with    Tooth Ache     Pt states she wants antibiotic for a tooth infection and rash      Pt ambulated to triage. Pt is alert, oriented, and follows commands. Pt speaking in full sentences and responds appropriately to questions. No acute distress noted in triage. Respirations are even and unlabored.     Pt to lobby and educated on triage process. Pt encouraged to alert triage staff for any changes in condition. Pt signed up for messaging updates prior to leaving triage room.    Vitals:    01/27/25 2141   BP: (!) 143/107   Pulse: (!) 108   Resp: 16   Temp: 36.1 °C (97 °F)   SpO2: 95%

## 2025-01-28 NOTE — ED PROVIDER NOTES
ER Provider Note    Scribed for RUPAL Swanson* by Mable Ryan. 1/27/2025  10:40 PM    Primary Care Provider: Pcp Pt States None    CHIEF COMPLAINT  Chief Complaint   Patient presents with    Tooth Ache     Pt states she wants antibiotic for a tooth infection and rash        EXTERNAL RECORDS REVIEWED  External ED Note The patient was seen at [facility] on 12/20/2024 for anxiety and a medication refill.     HPI/ROS  LIMITATION TO HISTORY   Select: : None    OUTSIDE HISTORIAN(S):  None.    Jamil Orozco Jr. is a 44 y.o. male who presents to the ED for evaluation of tooth ache. He describes that he has had this tooth ache for a couple months now and would like some antibiotics, as he thinks that there is tooth infection. No alleviating or exacerbating factors noted. The patient is also requesting an inhaler. The patient has no other complaints at this time.     PAST MEDICAL HISTORY  Past Medical History:   Diagnosis Date    Asthma     Schizophrenia (HCC)      SURGICAL HISTORY  History reviewed. No pertinent surgical history.    FAMILY HISTORY  History reviewed. No pertinent family history.    SOCIAL HISTORY   reports that he has quit smoking. His smoking use included cigarettes. He has never used smokeless tobacco. He reports current drug use. Drug: Inhaled. He reports that he does not drink alcohol.    CURRENT MEDICATIONS  Discharge Medication List as of 1/27/2025 10:58 PM        CONTINUE these medications which have NOT CHANGED    Details   diphenhydrAMINE (BENADRYL) 25 MG capsule Take 1 Capsule by mouth every 6 hours as needed for Rash., Disp-30 Capsule, R-0, Normal      mupirocin (BACTROBAN) 2 % Ointment Apply 1 Application topically 2 times a day., Disp-22 g, R-0, Normal      risperiDONE (RISPERDAL) 1 MG Tab Take 2 Tablets by mouth every evening AND 1 Tablet every morning., Disp-90 Tablet, R-0, Normal      Spacer/Aero-Holding Chambers Device Use with your inhaler, Disp-1 Each,  "R-0, Normal      levothyroxine (SYNTHROID) 100 MCG Tab Take 1 Tablet by mouth every morning on an empty stomach., Disp-30 Tablet, R-0, Normal      cephALEXin (KEFLEX) 500 MG Cap Take 1 Capsule by mouth 4 times a day., Disp-28 Capsule, R-0, Normal      sulfamethoxazole-trimethoprim (BACTRIM DS) 800-160 MG tablet Take 1 Tablet by mouth 2 times a day., Disp-14 Tablet, R-0, Normal           ALLERGIES  Penicillins      PHYSICAL EXAM  BP (!) 143/107   Pulse (!) 108   Temp 36.1 °C (97 °F) (Temporal)   Resp 16   Ht 1.727 m (5' 8\")   Wt 60.4 kg (133 lb 2.5 oz)   SpO2 95%   BMI 20.25 kg/m²     Constitutional: Alert in no apparent distress.  HENT: No signs of trauma, Bilateral external ears normal, Nose normal.   Mouth: Multiple decaying teeth. No facial swelling, tenderness or fluctuance   Eyes: Pupils are equal and reactive, Conjunctiva normal, Non-icteric.   Neck: Normal range of motion, No tenderness, Supple, No stridor.   Cardiovascular: Regular rate and rhythm, no murmurs.   Thorax & Lungs: Normal breath sounds, No respiratory distress, No wheezing, No chest tenderness.   Abdomen: Bowel sounds normal, Soft, No tenderness, No masses, No pulsatile masses.   Skin: Warm, Dry, No erythema, No rash.   Back: No bony tenderness, No CVA tenderness.   Extremities: Intact distal pulses, No edema, No tenderness, No cyanosis  Musculoskeletal: Good range of motion in all major joints. No tenderness to palpation or major deformities noted.   Neurologic: Alert , Normal motor function, Normal sensory function, No focal deficits noted.       COURSE & MEDICAL DECISION MAKING     INITIAL ASSESSMENT AND PLAN  Care Narrative: Patient presenting with concern for dental infection.  Patient does have a number of dental caries with reported worsening pain.  Patient has no signs of abscess, cellulitis.  Believe short course of doxycycline is reasonable given patient's worsening dental caries.  Patient requesting refill of albuterol and " analgesics.  Patient with recent drug use which I believe explains his tachycardia.  Low suspicion for sepsis.      10:40 PM - Patient seen and evaluated at bedside. This is a 44 year old man who presents to the emergency department requesting antibiotics for his tooth ache and an inhaler. Discussed discharge instructions and return precautions with the patient and they were cleared for discharge. Patient was given the opportunity to ask any further questions. He is comfortable with discharge at this time.                   DISPOSITION AND DISCUSSIONS  I have discussed management of the patient with the following physicians and MARIELLA's: None.    Discussion of management with other Q or appropriate source(s): None       Barriers to care at this time, including but not limited to: Patient does not have established PCP and Patient is homeless.     Decision tools and prescription drugs considered including, but not limited to: Antibiotics doxycycline and Pain Medications Tylenol .    The patient will return for new or worsening symptoms and is stable at the time of discharge.    DISPOSITION:  Patient will be discharged home in stable condition.    FOLLOW UP:  [facility]  [address] [place] [address] [id number] [phone]  Schedule an appointment as soon as possible for a visit       [facility], Emergency Dept  [address] [facility] [address] [phone]  Go to   If symptoms worsen      OUTPATIENT MEDICATIONS:  Discharge Medication List as of 1/27/2025 10:58 PM        START taking these medications    Details   acetaminophen (TYLENOL) 500 MG Tab Take 1-2 Tablets by mouth every 6 hours as needed for Moderate Pain., Disp-30 Tablet, R-0, Normal      doxycycline (MONODOX) 100 MG capsule Take 1 Capsule by mouth 2 times a day for 5 days., Disp-10 Capsule, R-0, Normal              FINAL IMPRESSION   1. Dental caries      I, Mable Ryan (Scribakash), am scribing for, and  in the presence of, MELVA Shay.    Electronically signed by: Mable Ryan (Scribe), 1/27/2025    I, RUPAL Shay* personally performed the services described in this documentation, as scribed by Mable Ryan in my presence, and it is both accurate and complete.    The note accurately reflects work and decisions made by me.  Pepito Finnegan D.O.  1/28/2025  1:49 AM

## 2025-01-28 NOTE — ED NOTES
Discharge paperwork provided, education provided by ERP. All questions and concerns addressed by Dr. Ziegler ambulated out of ER with all belongings and steady gait.

## 2025-02-18 VITALS
RESPIRATION RATE: 18 BRPM | OXYGEN SATURATION: 94 % | TEMPERATURE: 97 F | SYSTOLIC BLOOD PRESSURE: 154 MMHG | DIASTOLIC BLOOD PRESSURE: 111 MMHG | HEIGHT: 68 IN | BODY MASS INDEX: 20.15 KG/M2 | HEART RATE: 114 BPM | WEIGHT: 132.94 LBS

## 2025-02-18 PROCEDURE — 302449 STATCHG TRIAGE ONLY (STATISTIC)

## 2025-02-19 ENCOUNTER — HOSPITAL ENCOUNTER (EMERGENCY)
Facility: MEDICAL CENTER | Age: 45
End: 2025-02-19
Payer: COMMERCIAL

## 2025-02-19 PROCEDURE — 302449 STATCHG TRIAGE ONLY (STATISTIC)

## 2025-02-19 NOTE — ED TRIAGE NOTES
Chief Complaint   Patient presents with    Medication Refill     Pt states he needs his inhaler prescription filled    Dental Pain     Left upper dental pain      Pt ambulatory to triage for above complaint. Pt states that he has seen a dentist for this tooth and was told he needed to take antibiotics so they could pull the tooth out. Pt was unable to explain why the dentist didn't prescribe the required antibiotics. Pt has rambling speech in triage and sometimes requires questions to be asked multiple times to answer.      Pt is alert/oriented and follows commands. Pt speaking in full sentences and responds appropriately to questions. No acute distress noted in triage and respirations are even and unlabored.     Pt placed in lobby and educated on triage process. Pt encouraged to alert staff for any changes in condition.

## 2025-02-19 NOTE — ED NOTES
Pt called x1. Checked lobby, bathroom, phleb, Jasson, family room and all other associated areas and hallways.

## 2025-02-19 NOTE — ED NOTES
Pt called x2. Checked lobby, bathroom, phleb, Jasson, family room and all other associated areas and hallways. Will dismiss.

## 2025-03-08 NOTE — ED NOTES
Blaine and juice provided. Report given to [facility]. Awaiting meds to bed.    Rx for doxycycline and Mucinex DM and I am giving him a steroid shot in the office today.  He can call or return if symptoms persist or worsen.